# Patient Record
Sex: FEMALE | Race: OTHER | NOT HISPANIC OR LATINO | URBAN - METROPOLITAN AREA
[De-identification: names, ages, dates, MRNs, and addresses within clinical notes are randomized per-mention and may not be internally consistent; named-entity substitution may affect disease eponyms.]

---

## 2017-05-18 ENCOUNTER — OUTPATIENT (OUTPATIENT)
Dept: OUTPATIENT SERVICES | Facility: HOSPITAL | Age: 42
LOS: 1 days | Discharge: HOME | End: 2017-05-18

## 2017-06-15 ENCOUNTER — OUTPATIENT (OUTPATIENT)
Dept: OUTPATIENT SERVICES | Facility: HOSPITAL | Age: 42
LOS: 1 days | Discharge: HOME | End: 2017-06-15

## 2017-06-28 DIAGNOSIS — F25.0 SCHIZOAFFECTIVE DISORDER, BIPOLAR TYPE: ICD-10-CM

## 2017-07-13 ENCOUNTER — OUTPATIENT (OUTPATIENT)
Dept: OUTPATIENT SERVICES | Facility: HOSPITAL | Age: 42
LOS: 1 days | Discharge: HOME | End: 2017-07-13

## 2017-07-13 DIAGNOSIS — F25.0 SCHIZOAFFECTIVE DISORDER, BIPOLAR TYPE: ICD-10-CM

## 2017-08-31 ENCOUNTER — OUTPATIENT (OUTPATIENT)
Dept: OUTPATIENT SERVICES | Facility: HOSPITAL | Age: 42
LOS: 1 days | Discharge: HOME | End: 2017-08-31

## 2017-08-31 DIAGNOSIS — F25.0 SCHIZOAFFECTIVE DISORDER, BIPOLAR TYPE: ICD-10-CM

## 2017-09-26 ENCOUNTER — OUTPATIENT (OUTPATIENT)
Dept: OUTPATIENT SERVICES | Facility: HOSPITAL | Age: 42
LOS: 1 days | Discharge: HOME | End: 2017-09-26

## 2017-09-26 DIAGNOSIS — F25.0 SCHIZOAFFECTIVE DISORDER, BIPOLAR TYPE: ICD-10-CM

## 2017-10-18 ENCOUNTER — OUTPATIENT (OUTPATIENT)
Dept: OUTPATIENT SERVICES | Facility: HOSPITAL | Age: 42
LOS: 1 days | Discharge: HOME | End: 2017-10-18

## 2017-10-18 DIAGNOSIS — F25.0 SCHIZOAFFECTIVE DISORDER, BIPOLAR TYPE: ICD-10-CM

## 2017-11-09 ENCOUNTER — OUTPATIENT (OUTPATIENT)
Dept: OUTPATIENT SERVICES | Facility: HOSPITAL | Age: 42
LOS: 1 days | Discharge: HOME | End: 2017-11-09

## 2017-11-09 DIAGNOSIS — F25.0 SCHIZOAFFECTIVE DISORDER, BIPOLAR TYPE: ICD-10-CM

## 2017-12-21 ENCOUNTER — OUTPATIENT (OUTPATIENT)
Dept: OUTPATIENT SERVICES | Facility: HOSPITAL | Age: 42
LOS: 1 days | Discharge: HOME | End: 2017-12-21

## 2017-12-21 DIAGNOSIS — F25.0 SCHIZOAFFECTIVE DISORDER, BIPOLAR TYPE: ICD-10-CM

## 2018-01-23 ENCOUNTER — OUTPATIENT (OUTPATIENT)
Dept: OUTPATIENT SERVICES | Facility: HOSPITAL | Age: 43
LOS: 1 days | Discharge: HOME | End: 2018-01-23

## 2018-01-23 DIAGNOSIS — F25.0 SCHIZOAFFECTIVE DISORDER, BIPOLAR TYPE: ICD-10-CM

## 2018-02-27 ENCOUNTER — OUTPATIENT (OUTPATIENT)
Dept: OUTPATIENT SERVICES | Facility: HOSPITAL | Age: 43
LOS: 1 days | Discharge: HOME | End: 2018-02-27

## 2018-02-27 DIAGNOSIS — F25.0 SCHIZOAFFECTIVE DISORDER, BIPOLAR TYPE: ICD-10-CM

## 2018-04-24 ENCOUNTER — OUTPATIENT (OUTPATIENT)
Dept: OUTPATIENT SERVICES | Facility: HOSPITAL | Age: 43
LOS: 1 days | Discharge: HOME | End: 2018-04-24

## 2018-04-24 DIAGNOSIS — F25.0 SCHIZOAFFECTIVE DISORDER, BIPOLAR TYPE: ICD-10-CM

## 2018-05-14 ENCOUNTER — OUTPATIENT (OUTPATIENT)
Dept: OUTPATIENT SERVICES | Facility: HOSPITAL | Age: 43
LOS: 1 days | Discharge: HOME | End: 2018-05-14

## 2018-05-14 DIAGNOSIS — F25.0 SCHIZOAFFECTIVE DISORDER, BIPOLAR TYPE: ICD-10-CM

## 2018-06-11 ENCOUNTER — OUTPATIENT (OUTPATIENT)
Dept: OUTPATIENT SERVICES | Facility: HOSPITAL | Age: 43
LOS: 1 days | Discharge: HOME | End: 2018-06-11

## 2018-06-11 DIAGNOSIS — F25.0 SCHIZOAFFECTIVE DISORDER, BIPOLAR TYPE: ICD-10-CM

## 2018-07-16 ENCOUNTER — OUTPATIENT (OUTPATIENT)
Dept: OUTPATIENT SERVICES | Facility: HOSPITAL | Age: 43
LOS: 1 days | Discharge: HOME | End: 2018-07-16

## 2018-07-16 DIAGNOSIS — F25.0 SCHIZOAFFECTIVE DISORDER, BIPOLAR TYPE: ICD-10-CM

## 2018-10-16 ENCOUNTER — OUTPATIENT (OUTPATIENT)
Dept: OUTPATIENT SERVICES | Facility: HOSPITAL | Age: 43
LOS: 1 days | Discharge: HOME | End: 2018-10-16

## 2018-10-16 DIAGNOSIS — F25.0 SCHIZOAFFECTIVE DISORDER, BIPOLAR TYPE: ICD-10-CM

## 2018-11-27 ENCOUNTER — OUTPATIENT (OUTPATIENT)
Dept: OUTPATIENT SERVICES | Facility: HOSPITAL | Age: 43
LOS: 1 days | Discharge: HOME | End: 2018-11-27

## 2018-11-27 DIAGNOSIS — F25.0 SCHIZOAFFECTIVE DISORDER, BIPOLAR TYPE: ICD-10-CM

## 2018-12-19 ENCOUNTER — OUTPATIENT (OUTPATIENT)
Dept: OUTPATIENT SERVICES | Facility: HOSPITAL | Age: 43
LOS: 1 days | Discharge: HOME | End: 2018-12-19

## 2018-12-19 DIAGNOSIS — F25.0 SCHIZOAFFECTIVE DISORDER, BIPOLAR TYPE: ICD-10-CM

## 2019-01-03 ENCOUNTER — OUTPATIENT (OUTPATIENT)
Dept: OUTPATIENT SERVICES | Facility: HOSPITAL | Age: 44
LOS: 1 days | Discharge: HOME | End: 2019-01-03

## 2019-01-03 DIAGNOSIS — E13.9 OTHER SPECIFIED DIABETES MELLITUS WITHOUT COMPLICATIONS: ICD-10-CM

## 2019-01-03 DIAGNOSIS — E66.01 MORBID (SEVERE) OBESITY DUE TO EXCESS CALORIES: ICD-10-CM

## 2019-01-03 DIAGNOSIS — R94.6 ABNORMAL RESULTS OF THYROID FUNCTION STUDIES: ICD-10-CM

## 2019-01-03 DIAGNOSIS — E78.5 HYPERLIPIDEMIA, UNSPECIFIED: ICD-10-CM

## 2019-01-03 DIAGNOSIS — R00.2 PALPITATIONS: ICD-10-CM

## 2019-01-03 DIAGNOSIS — D51.9 VITAMIN B12 DEFICIENCY ANEMIA, UNSPECIFIED: ICD-10-CM

## 2019-01-03 DIAGNOSIS — R53.82 CHRONIC FATIGUE, UNSPECIFIED: ICD-10-CM

## 2019-01-24 ENCOUNTER — OUTPATIENT (OUTPATIENT)
Dept: OUTPATIENT SERVICES | Facility: HOSPITAL | Age: 44
LOS: 1 days | Discharge: HOME | End: 2019-01-24

## 2019-01-24 DIAGNOSIS — F25.0 SCHIZOAFFECTIVE DISORDER, BIPOLAR TYPE: ICD-10-CM

## 2019-03-11 ENCOUNTER — OUTPATIENT (OUTPATIENT)
Dept: OUTPATIENT SERVICES | Facility: HOSPITAL | Age: 44
LOS: 1 days | Discharge: HOME | End: 2019-03-11

## 2019-03-11 DIAGNOSIS — F25.0 SCHIZOAFFECTIVE DISORDER, BIPOLAR TYPE: ICD-10-CM

## 2019-03-28 ENCOUNTER — OUTPATIENT (OUTPATIENT)
Dept: OUTPATIENT SERVICES | Facility: HOSPITAL | Age: 44
LOS: 1 days | Discharge: HOME | End: 2019-03-28

## 2019-03-28 DIAGNOSIS — F25.0 SCHIZOAFFECTIVE DISORDER, BIPOLAR TYPE: ICD-10-CM

## 2019-04-18 ENCOUNTER — OUTPATIENT (OUTPATIENT)
Dept: OUTPATIENT SERVICES | Facility: HOSPITAL | Age: 44
LOS: 1 days | Discharge: HOME | End: 2019-04-18

## 2019-04-18 DIAGNOSIS — F25.0 SCHIZOAFFECTIVE DISORDER, BIPOLAR TYPE: ICD-10-CM

## 2019-05-09 ENCOUNTER — OUTPATIENT (OUTPATIENT)
Dept: OUTPATIENT SERVICES | Facility: HOSPITAL | Age: 44
LOS: 1 days | Discharge: HOME | End: 2019-05-09

## 2019-05-09 DIAGNOSIS — F25.0 SCHIZOAFFECTIVE DISORDER, BIPOLAR TYPE: ICD-10-CM

## 2019-05-16 ENCOUNTER — OUTPATIENT (OUTPATIENT)
Dept: OUTPATIENT SERVICES | Facility: HOSPITAL | Age: 44
LOS: 1 days | Discharge: HOME | End: 2019-05-16

## 2019-05-16 DIAGNOSIS — R53.82 CHRONIC FATIGUE, UNSPECIFIED: ICD-10-CM

## 2019-05-16 DIAGNOSIS — E03.9 HYPOTHYROIDISM, UNSPECIFIED: ICD-10-CM

## 2019-05-16 DIAGNOSIS — R94.6 ABNORMAL RESULTS OF THYROID FUNCTION STUDIES: ICD-10-CM

## 2019-05-30 ENCOUNTER — OUTPATIENT (OUTPATIENT)
Dept: OUTPATIENT SERVICES | Facility: HOSPITAL | Age: 44
LOS: 1 days | Discharge: HOME | End: 2019-05-30

## 2019-05-30 DIAGNOSIS — F25.0 SCHIZOAFFECTIVE DISORDER, BIPOLAR TYPE: ICD-10-CM

## 2019-06-13 ENCOUNTER — OUTPATIENT (OUTPATIENT)
Dept: OUTPATIENT SERVICES | Facility: HOSPITAL | Age: 44
LOS: 1 days | Discharge: HOME | End: 2019-06-13
Payer: MEDICARE

## 2019-06-13 DIAGNOSIS — F25.0 SCHIZOAFFECTIVE DISORDER, BIPOLAR TYPE: ICD-10-CM

## 2019-06-13 PROCEDURE — 99215 OFFICE O/P EST HI 40 MIN: CPT

## 2019-06-28 ENCOUNTER — OUTPATIENT (OUTPATIENT)
Dept: OUTPATIENT SERVICES | Facility: HOSPITAL | Age: 44
LOS: 1 days | Discharge: HOME | End: 2019-06-28

## 2019-06-28 DIAGNOSIS — F25.0 SCHIZOAFFECTIVE DISORDER, BIPOLAR TYPE: ICD-10-CM

## 2019-07-25 ENCOUNTER — OUTPATIENT (OUTPATIENT)
Dept: OUTPATIENT SERVICES | Facility: HOSPITAL | Age: 44
LOS: 1 days | Discharge: HOME | End: 2019-07-25

## 2019-07-25 DIAGNOSIS — F25.0 SCHIZOAFFECTIVE DISORDER, BIPOLAR TYPE: ICD-10-CM

## 2019-09-05 ENCOUNTER — OUTPATIENT (OUTPATIENT)
Dept: OUTPATIENT SERVICES | Facility: HOSPITAL | Age: 44
LOS: 1 days | Discharge: HOME | End: 2019-09-05

## 2019-09-05 DIAGNOSIS — F25.0 SCHIZOAFFECTIVE DISORDER, BIPOLAR TYPE: ICD-10-CM

## 2019-09-09 ENCOUNTER — OUTPATIENT (OUTPATIENT)
Dept: OUTPATIENT SERVICES | Facility: HOSPITAL | Age: 44
LOS: 1 days | Discharge: HOME | End: 2019-09-09
Payer: MEDICARE

## 2019-09-09 DIAGNOSIS — F25.0 SCHIZOAFFECTIVE DISORDER, BIPOLAR TYPE: ICD-10-CM

## 2019-09-09 PROCEDURE — 99214 OFFICE O/P EST MOD 30 MIN: CPT

## 2019-09-12 ENCOUNTER — OUTPATIENT (OUTPATIENT)
Dept: OUTPATIENT SERVICES | Facility: HOSPITAL | Age: 44
LOS: 1 days | Discharge: HOME | End: 2019-09-12

## 2019-09-12 DIAGNOSIS — R94.6 ABNORMAL RESULTS OF THYROID FUNCTION STUDIES: ICD-10-CM

## 2019-09-12 DIAGNOSIS — R53.82 CHRONIC FATIGUE, UNSPECIFIED: ICD-10-CM

## 2019-09-12 DIAGNOSIS — D51.9 VITAMIN B12 DEFICIENCY ANEMIA, UNSPECIFIED: ICD-10-CM

## 2019-09-12 DIAGNOSIS — E78.5 HYPERLIPIDEMIA, UNSPECIFIED: ICD-10-CM

## 2019-09-12 DIAGNOSIS — E13.9 OTHER SPECIFIED DIABETES MELLITUS WITHOUT COMPLICATIONS: ICD-10-CM

## 2019-09-12 DIAGNOSIS — E66.01 MORBID (SEVERE) OBESITY DUE TO EXCESS CALORIES: ICD-10-CM

## 2019-09-12 DIAGNOSIS — E03.9 HYPOTHYROIDISM, UNSPECIFIED: ICD-10-CM

## 2019-09-26 ENCOUNTER — OUTPATIENT (OUTPATIENT)
Dept: OUTPATIENT SERVICES | Facility: HOSPITAL | Age: 44
LOS: 1 days | Discharge: HOME | End: 2019-09-26

## 2019-09-26 DIAGNOSIS — F25.0 SCHIZOAFFECTIVE DISORDER, BIPOLAR TYPE: ICD-10-CM

## 2019-10-17 ENCOUNTER — OUTPATIENT (OUTPATIENT)
Dept: OUTPATIENT SERVICES | Facility: HOSPITAL | Age: 44
LOS: 1 days | Discharge: HOME | End: 2019-10-17

## 2019-10-17 DIAGNOSIS — F25.0 SCHIZOAFFECTIVE DISORDER, BIPOLAR TYPE: ICD-10-CM

## 2019-11-12 ENCOUNTER — OUTPATIENT (OUTPATIENT)
Dept: OUTPATIENT SERVICES | Facility: HOSPITAL | Age: 44
LOS: 1 days | Discharge: HOME | End: 2019-11-12

## 2019-11-12 DIAGNOSIS — F25.0 SCHIZOAFFECTIVE DISORDER, BIPOLAR TYPE: ICD-10-CM

## 2019-12-12 ENCOUNTER — OUTPATIENT (OUTPATIENT)
Dept: OUTPATIENT SERVICES | Facility: HOSPITAL | Age: 44
LOS: 1 days | Discharge: HOME | End: 2019-12-12

## 2019-12-13 DIAGNOSIS — R53.82 CHRONIC FATIGUE, UNSPECIFIED: ICD-10-CM

## 2019-12-13 DIAGNOSIS — R94.6 ABNORMAL RESULTS OF THYROID FUNCTION STUDIES: ICD-10-CM

## 2020-01-08 ENCOUNTER — OUTPATIENT (OUTPATIENT)
Dept: OUTPATIENT SERVICES | Facility: HOSPITAL | Age: 45
LOS: 1 days | Discharge: HOME | End: 2020-01-08
Payer: MEDICARE

## 2020-01-08 DIAGNOSIS — F25.0 SCHIZOAFFECTIVE DISORDER, BIPOLAR TYPE: ICD-10-CM

## 2020-01-08 PROCEDURE — 99213 OFFICE O/P EST LOW 20 MIN: CPT

## 2020-01-09 ENCOUNTER — OUTPATIENT (OUTPATIENT)
Dept: OUTPATIENT SERVICES | Facility: HOSPITAL | Age: 45
LOS: 1 days | Discharge: HOME | End: 2020-01-09

## 2020-01-09 DIAGNOSIS — F25.0 SCHIZOAFFECTIVE DISORDER, BIPOLAR TYPE: ICD-10-CM

## 2020-02-04 ENCOUNTER — OUTPATIENT (OUTPATIENT)
Dept: OUTPATIENT SERVICES | Facility: HOSPITAL | Age: 45
LOS: 1 days | Discharge: HOME | End: 2020-02-04

## 2020-02-04 DIAGNOSIS — F25.0 SCHIZOAFFECTIVE DISORDER, BIPOLAR TYPE: ICD-10-CM

## 2020-02-27 ENCOUNTER — OUTPATIENT (OUTPATIENT)
Dept: OUTPATIENT SERVICES | Facility: HOSPITAL | Age: 45
LOS: 1 days | Discharge: HOME | End: 2020-02-27

## 2020-02-27 DIAGNOSIS — F25.0 SCHIZOAFFECTIVE DISORDER, BIPOLAR TYPE: ICD-10-CM

## 2020-03-19 ENCOUNTER — OUTPATIENT (OUTPATIENT)
Dept: OUTPATIENT SERVICES | Facility: HOSPITAL | Age: 45
LOS: 1 days | Discharge: HOME | End: 2020-03-19

## 2020-03-24 DIAGNOSIS — F25.0 SCHIZOAFFECTIVE DISORDER, BIPOLAR TYPE: ICD-10-CM

## 2020-04-08 ENCOUNTER — OUTPATIENT (OUTPATIENT)
Dept: OUTPATIENT SERVICES | Facility: HOSPITAL | Age: 45
LOS: 1 days | Discharge: HOME | End: 2020-04-08
Payer: MEDICARE

## 2020-04-08 DIAGNOSIS — F25.0 SCHIZOAFFECTIVE DISORDER, BIPOLAR TYPE: ICD-10-CM

## 2020-04-08 PROCEDURE — 99442: CPT

## 2020-04-09 ENCOUNTER — OUTPATIENT (OUTPATIENT)
Dept: OUTPATIENT SERVICES | Facility: HOSPITAL | Age: 45
LOS: 1 days | Discharge: HOME | End: 2020-04-09

## 2020-04-30 ENCOUNTER — OUTPATIENT (OUTPATIENT)
Dept: OUTPATIENT SERVICES | Facility: HOSPITAL | Age: 45
LOS: 1 days | Discharge: HOME | End: 2020-04-30

## 2020-04-30 DIAGNOSIS — F25.0 SCHIZOAFFECTIVE DISORDER, BIPOLAR TYPE: ICD-10-CM

## 2020-05-21 ENCOUNTER — OUTPATIENT (OUTPATIENT)
Dept: OUTPATIENT SERVICES | Facility: HOSPITAL | Age: 45
LOS: 1 days | Discharge: HOME | End: 2020-05-21

## 2020-05-21 DIAGNOSIS — F25.0 SCHIZOAFFECTIVE DISORDER, BIPOLAR TYPE: ICD-10-CM

## 2020-06-11 ENCOUNTER — OUTPATIENT (OUTPATIENT)
Dept: OUTPATIENT SERVICES | Facility: HOSPITAL | Age: 45
LOS: 1 days | Discharge: HOME | End: 2020-06-11

## 2020-06-11 DIAGNOSIS — F25.0 SCHIZOAFFECTIVE DISORDER, BIPOLAR TYPE: ICD-10-CM

## 2020-07-23 ENCOUNTER — OUTPATIENT (OUTPATIENT)
Dept: OUTPATIENT SERVICES | Facility: HOSPITAL | Age: 45
LOS: 1 days | Discharge: HOME | End: 2020-07-23

## 2020-07-23 DIAGNOSIS — F25.0 SCHIZOAFFECTIVE DISORDER, BIPOLAR TYPE: ICD-10-CM

## 2020-08-25 ENCOUNTER — OUTPATIENT (OUTPATIENT)
Dept: OUTPATIENT SERVICES | Facility: HOSPITAL | Age: 45
LOS: 1 days | Discharge: HOME | End: 2020-08-25
Payer: MEDICARE

## 2020-08-25 DIAGNOSIS — F25.0 SCHIZOAFFECTIVE DISORDER, BIPOLAR TYPE: ICD-10-CM

## 2020-08-25 PROCEDURE — 99214 OFFICE O/P EST MOD 30 MIN: CPT | Mod: 95

## 2020-08-27 ENCOUNTER — OUTPATIENT (OUTPATIENT)
Dept: OUTPATIENT SERVICES | Facility: HOSPITAL | Age: 45
LOS: 1 days | Discharge: HOME | End: 2020-08-27

## 2020-08-27 DIAGNOSIS — F25.0 SCHIZOAFFECTIVE DISORDER, BIPOLAR TYPE: ICD-10-CM

## 2020-09-23 ENCOUNTER — OUTPATIENT (OUTPATIENT)
Dept: OUTPATIENT SERVICES | Facility: HOSPITAL | Age: 45
LOS: 1 days | Discharge: HOME | End: 2020-09-23

## 2020-09-23 DIAGNOSIS — F25.0 SCHIZOAFFECTIVE DISORDER, BIPOLAR TYPE: ICD-10-CM

## 2020-10-21 ENCOUNTER — OUTPATIENT (OUTPATIENT)
Dept: OUTPATIENT SERVICES | Facility: HOSPITAL | Age: 45
LOS: 1 days | Discharge: HOME | End: 2020-10-21

## 2020-10-21 DIAGNOSIS — F25.0 SCHIZOAFFECTIVE DISORDER, BIPOLAR TYPE: ICD-10-CM

## 2020-11-17 ENCOUNTER — OUTPATIENT (OUTPATIENT)
Dept: OUTPATIENT SERVICES | Facility: HOSPITAL | Age: 45
LOS: 1 days | Discharge: HOME | End: 2020-11-17
Payer: MEDICARE

## 2020-11-17 ENCOUNTER — APPOINTMENT (OUTPATIENT)
Dept: PSYCHIATRY | Facility: CLINIC | Age: 45
End: 2020-11-17

## 2020-11-17 DIAGNOSIS — F25.0 SCHIZOAFFECTIVE DISORDER, BIPOLAR TYPE: ICD-10-CM

## 2020-11-17 DIAGNOSIS — F43.10 POST-TRAUMATIC STRESS DISORDER, UNSPECIFIED: ICD-10-CM

## 2020-11-17 PROCEDURE — 99213 OFFICE O/P EST LOW 20 MIN: CPT | Mod: 95

## 2020-11-18 ENCOUNTER — APPOINTMENT (OUTPATIENT)
Dept: PSYCHIATRY | Facility: CLINIC | Age: 45
End: 2020-11-18

## 2020-11-18 ENCOUNTER — OUTPATIENT (OUTPATIENT)
Dept: OUTPATIENT SERVICES | Facility: HOSPITAL | Age: 45
LOS: 1 days | Discharge: HOME | End: 2020-11-18

## 2020-11-20 DIAGNOSIS — F25.0 SCHIZOAFFECTIVE DISORDER, BIPOLAR TYPE: ICD-10-CM

## 2020-11-20 DIAGNOSIS — F43.10 POST-TRAUMATIC STRESS DISORDER, UNSPECIFIED: ICD-10-CM

## 2020-12-15 ENCOUNTER — OUTPATIENT (OUTPATIENT)
Dept: OUTPATIENT SERVICES | Facility: HOSPITAL | Age: 45
LOS: 1 days | Discharge: HOME | End: 2020-12-15

## 2020-12-15 ENCOUNTER — APPOINTMENT (OUTPATIENT)
Dept: PSYCHIATRY | Facility: CLINIC | Age: 45
End: 2020-12-15

## 2020-12-15 DIAGNOSIS — F43.10 POST-TRAUMATIC STRESS DISORDER, UNSPECIFIED: ICD-10-CM

## 2020-12-15 DIAGNOSIS — F25.0 SCHIZOAFFECTIVE DISORDER, BIPOLAR TYPE: ICD-10-CM

## 2020-12-15 PROBLEM — Z00.00 ENCOUNTER FOR PREVENTIVE HEALTH EXAMINATION: Status: ACTIVE | Noted: 2020-12-15

## 2020-12-16 DIAGNOSIS — Z92.89 PERSONAL HISTORY OF OTHER MEDICAL TREATMENT: ICD-10-CM

## 2020-12-16 DIAGNOSIS — Z78.9 OTHER SPECIFIED HEALTH STATUS: ICD-10-CM

## 2020-12-16 RX ORDER — FAMOTIDINE 20 MG/1
20 TABLET, FILM COATED ORAL
Refills: 0 | Status: ACTIVE | COMMUNITY

## 2020-12-16 RX ORDER — FLUTICASONE PROPIONATE 50 MCG
SPRAY, SUSPENSION NASAL
Refills: 0 | Status: ACTIVE | COMMUNITY

## 2020-12-16 RX ORDER — LEVOTHYROXINE SODIUM 50 UG/1
50 TABLET ORAL DAILY
Refills: 0 | Status: ACTIVE | COMMUNITY

## 2021-01-20 ENCOUNTER — NON-APPOINTMENT (OUTPATIENT)
Age: 46
End: 2021-01-20

## 2021-01-20 ENCOUNTER — APPOINTMENT (OUTPATIENT)
Dept: PSYCHIATRY | Facility: CLINIC | Age: 46
End: 2021-01-20

## 2021-01-20 ENCOUNTER — OUTPATIENT (OUTPATIENT)
Dept: OUTPATIENT SERVICES | Facility: HOSPITAL | Age: 46
LOS: 1 days | Discharge: HOME | End: 2021-01-20

## 2021-01-20 DIAGNOSIS — F43.10 POST-TRAUMATIC STRESS DISORDER, UNSPECIFIED: ICD-10-CM

## 2021-01-20 DIAGNOSIS — F25.0 SCHIZOAFFECTIVE DISORDER, BIPOLAR TYPE: ICD-10-CM

## 2021-02-08 ENCOUNTER — OUTPATIENT (OUTPATIENT)
Dept: OUTPATIENT SERVICES | Facility: HOSPITAL | Age: 46
LOS: 1 days | Discharge: HOME | End: 2021-02-08

## 2021-02-08 ENCOUNTER — APPOINTMENT (OUTPATIENT)
Dept: PSYCHIATRY | Facility: CLINIC | Age: 46
End: 2021-02-08
Payer: MEDICARE

## 2021-02-08 DIAGNOSIS — F25.0 SCHIZOAFFECTIVE DISORDER, BIPOLAR TYPE: ICD-10-CM

## 2021-02-08 DIAGNOSIS — F43.10 POST-TRAUMATIC STRESS DISORDER, UNSPECIFIED: ICD-10-CM

## 2021-02-08 DIAGNOSIS — E03.9 HYPOTHYROIDISM, UNSPECIFIED: ICD-10-CM

## 2021-02-08 DIAGNOSIS — K21.9 GASTRO-ESOPHAGEAL REFLUX DISEASE W/OUT ESOPHAGITIS: ICD-10-CM

## 2021-02-08 PROCEDURE — 99442: CPT

## 2021-02-24 ENCOUNTER — APPOINTMENT (OUTPATIENT)
Dept: PSYCHIATRY | Facility: CLINIC | Age: 46
End: 2021-02-24

## 2021-02-24 ENCOUNTER — OUTPATIENT (OUTPATIENT)
Dept: OUTPATIENT SERVICES | Facility: HOSPITAL | Age: 46
LOS: 1 days | Discharge: HOME | End: 2021-02-24

## 2021-02-24 DIAGNOSIS — F25.0 SCHIZOAFFECTIVE DISORDER, BIPOLAR TYPE: ICD-10-CM

## 2021-02-24 DIAGNOSIS — F43.10 POST-TRAUMATIC STRESS DISORDER, UNSPECIFIED: ICD-10-CM

## 2021-03-24 ENCOUNTER — APPOINTMENT (OUTPATIENT)
Dept: PSYCHIATRY | Facility: CLINIC | Age: 46
End: 2021-03-24

## 2021-03-24 ENCOUNTER — OUTPATIENT (OUTPATIENT)
Dept: OUTPATIENT SERVICES | Facility: HOSPITAL | Age: 46
LOS: 1 days | Discharge: HOME | End: 2021-03-24

## 2021-03-24 DIAGNOSIS — F25.0 SCHIZOAFFECTIVE DISORDER, BIPOLAR TYPE: ICD-10-CM

## 2021-03-24 DIAGNOSIS — F43.10 POST-TRAUMATIC STRESS DISORDER, UNSPECIFIED: ICD-10-CM

## 2021-03-29 ENCOUNTER — APPOINTMENT (OUTPATIENT)
Dept: PSYCHIATRY | Facility: CLINIC | Age: 46
End: 2021-03-29

## 2021-04-02 ENCOUNTER — APPOINTMENT (OUTPATIENT)
Dept: PSYCHIATRY | Facility: CLINIC | Age: 46
End: 2021-04-02

## 2021-04-21 ENCOUNTER — NON-APPOINTMENT (OUTPATIENT)
Age: 46
End: 2021-04-21

## 2021-04-21 ENCOUNTER — OUTPATIENT (OUTPATIENT)
Dept: OUTPATIENT SERVICES | Facility: HOSPITAL | Age: 46
LOS: 1 days | Discharge: HOME | End: 2021-04-21

## 2021-04-21 ENCOUNTER — APPOINTMENT (OUTPATIENT)
Dept: PSYCHIATRY | Facility: CLINIC | Age: 46
End: 2021-04-21

## 2021-04-21 DIAGNOSIS — F43.10 POST-TRAUMATIC STRESS DISORDER, UNSPECIFIED: ICD-10-CM

## 2021-04-21 DIAGNOSIS — F25.0 SCHIZOAFFECTIVE DISORDER, BIPOLAR TYPE: ICD-10-CM

## 2021-05-05 ENCOUNTER — APPOINTMENT (OUTPATIENT)
Dept: PSYCHIATRY | Facility: CLINIC | Age: 46
End: 2021-05-05
Payer: MEDICARE

## 2021-05-05 ENCOUNTER — OUTPATIENT (OUTPATIENT)
Dept: OUTPATIENT SERVICES | Facility: HOSPITAL | Age: 46
LOS: 1 days | Discharge: HOME | End: 2021-05-05

## 2021-05-05 DIAGNOSIS — F25.0 SCHIZOAFFECTIVE DISORDER, BIPOLAR TYPE: ICD-10-CM

## 2021-05-05 DIAGNOSIS — F43.10 POST-TRAUMATIC STRESS DISORDER, UNSPECIFIED: ICD-10-CM

## 2021-05-05 PROCEDURE — 99213 OFFICE O/P EST LOW 20 MIN: CPT | Mod: 95

## 2021-05-05 RX ORDER — BENZTROPINE MESYLATE 1 MG
1 TABLET ORAL TWICE DAILY
Refills: 0 | Status: DISCONTINUED | COMMUNITY
End: 2021-05-05

## 2021-05-05 RX ORDER — PERPHENAZINE 4 MG
TABLET ORAL
Refills: 0 | Status: DISCONTINUED | COMMUNITY
End: 2021-05-05

## 2021-05-19 ENCOUNTER — OUTPATIENT (OUTPATIENT)
Dept: OUTPATIENT SERVICES | Facility: HOSPITAL | Age: 46
LOS: 1 days | Discharge: HOME | End: 2021-05-19

## 2021-05-19 ENCOUNTER — APPOINTMENT (OUTPATIENT)
Dept: PSYCHIATRY | Facility: CLINIC | Age: 46
End: 2021-05-19

## 2021-05-19 DIAGNOSIS — F43.10 POST-TRAUMATIC STRESS DISORDER, UNSPECIFIED: ICD-10-CM

## 2021-05-19 DIAGNOSIS — F25.0 SCHIZOAFFECTIVE DISORDER, BIPOLAR TYPE: ICD-10-CM

## 2021-06-30 ENCOUNTER — APPOINTMENT (OUTPATIENT)
Dept: PSYCHIATRY | Facility: CLINIC | Age: 46
End: 2021-06-30

## 2021-06-30 ENCOUNTER — NON-APPOINTMENT (OUTPATIENT)
Age: 46
End: 2021-06-30

## 2021-06-30 ENCOUNTER — OUTPATIENT (OUTPATIENT)
Dept: OUTPATIENT SERVICES | Facility: HOSPITAL | Age: 46
LOS: 1 days | Discharge: HOME | End: 2021-06-30

## 2021-06-30 DIAGNOSIS — F25.0 SCHIZOAFFECTIVE DISORDER, BIPOLAR TYPE: ICD-10-CM

## 2021-06-30 DIAGNOSIS — F43.10 POST-TRAUMATIC STRESS DISORDER, UNSPECIFIED: ICD-10-CM

## 2021-07-28 ENCOUNTER — APPOINTMENT (OUTPATIENT)
Dept: PSYCHIATRY | Facility: CLINIC | Age: 46
End: 2021-07-28
Payer: MEDICARE

## 2021-07-28 ENCOUNTER — OUTPATIENT (OUTPATIENT)
Dept: OUTPATIENT SERVICES | Facility: HOSPITAL | Age: 46
LOS: 1 days | Discharge: HOME | End: 2021-07-28

## 2021-07-28 DIAGNOSIS — F43.10 POST-TRAUMATIC STRESS DISORDER, UNSPECIFIED: ICD-10-CM

## 2021-07-28 DIAGNOSIS — F25.0 SCHIZOAFFECTIVE DISORDER, BIPOLAR TYPE: ICD-10-CM

## 2021-07-28 PROCEDURE — 99213 OFFICE O/P EST LOW 20 MIN: CPT | Mod: 95

## 2021-07-29 ENCOUNTER — NON-APPOINTMENT (OUTPATIENT)
Age: 46
End: 2021-07-29

## 2021-07-29 ENCOUNTER — APPOINTMENT (OUTPATIENT)
Dept: PSYCHIATRY | Facility: CLINIC | Age: 46
End: 2021-07-29

## 2021-07-29 ENCOUNTER — OUTPATIENT (OUTPATIENT)
Dept: OUTPATIENT SERVICES | Facility: HOSPITAL | Age: 46
LOS: 1 days | Discharge: HOME | End: 2021-07-29

## 2021-07-29 DIAGNOSIS — F43.10 POST-TRAUMATIC STRESS DISORDER, UNSPECIFIED: ICD-10-CM

## 2021-07-29 DIAGNOSIS — F25.0 SCHIZOAFFECTIVE DISORDER, BIPOLAR TYPE: ICD-10-CM

## 2021-08-26 ENCOUNTER — APPOINTMENT (OUTPATIENT)
Dept: PSYCHIATRY | Facility: CLINIC | Age: 46
End: 2021-08-26

## 2021-08-30 ENCOUNTER — OUTPATIENT (OUTPATIENT)
Dept: OUTPATIENT SERVICES | Facility: HOSPITAL | Age: 46
LOS: 1 days | Discharge: HOME | End: 2021-08-30

## 2021-08-30 ENCOUNTER — APPOINTMENT (OUTPATIENT)
Dept: PSYCHIATRY | Facility: CLINIC | Age: 46
End: 2021-08-30

## 2021-08-30 DIAGNOSIS — F25.0 SCHIZOAFFECTIVE DISORDER, BIPOLAR TYPE: ICD-10-CM

## 2021-08-30 DIAGNOSIS — F43.10 POST-TRAUMATIC STRESS DISORDER, UNSPECIFIED: ICD-10-CM

## 2021-09-29 ENCOUNTER — APPOINTMENT (OUTPATIENT)
Dept: PSYCHIATRY | Facility: CLINIC | Age: 46
End: 2021-09-29

## 2021-10-19 ENCOUNTER — NON-APPOINTMENT (OUTPATIENT)
Age: 46
End: 2021-10-19

## 2021-10-19 ENCOUNTER — APPOINTMENT (OUTPATIENT)
Dept: PSYCHIATRY | Facility: CLINIC | Age: 46
End: 2021-10-19

## 2021-10-19 ENCOUNTER — OUTPATIENT (OUTPATIENT)
Dept: OUTPATIENT SERVICES | Facility: HOSPITAL | Age: 46
LOS: 1 days | Discharge: HOME | End: 2021-10-19

## 2021-10-19 DIAGNOSIS — F43.10 POST-TRAUMATIC STRESS DISORDER, UNSPECIFIED: ICD-10-CM

## 2021-10-19 DIAGNOSIS — F25.0 SCHIZOAFFECTIVE DISORDER, BIPOLAR TYPE: ICD-10-CM

## 2021-11-17 ENCOUNTER — OUTPATIENT (OUTPATIENT)
Dept: OUTPATIENT SERVICES | Facility: HOSPITAL | Age: 46
LOS: 1 days | Discharge: HOME | End: 2021-11-17

## 2021-11-17 ENCOUNTER — APPOINTMENT (OUTPATIENT)
Dept: PSYCHIATRY | Facility: CLINIC | Age: 46
End: 2021-11-17

## 2021-11-17 DIAGNOSIS — F43.10 POST-TRAUMATIC STRESS DISORDER, UNSPECIFIED: ICD-10-CM

## 2021-11-17 DIAGNOSIS — F25.0 SCHIZOAFFECTIVE DISORDER, BIPOLAR TYPE: ICD-10-CM

## 2021-11-26 ENCOUNTER — OUTPATIENT (OUTPATIENT)
Dept: OUTPATIENT SERVICES | Facility: HOSPITAL | Age: 46
LOS: 1 days | Discharge: HOME | End: 2021-11-26

## 2021-11-26 ENCOUNTER — APPOINTMENT (OUTPATIENT)
Dept: PSYCHIATRY | Facility: CLINIC | Age: 46
End: 2021-11-26
Payer: MEDICARE

## 2021-11-26 DIAGNOSIS — F43.10 POST-TRAUMATIC STRESS DISORDER, UNSPECIFIED: ICD-10-CM

## 2021-11-26 DIAGNOSIS — F25.0 SCHIZOAFFECTIVE DISORDER, BIPOLAR TYPE: ICD-10-CM

## 2021-11-26 PROCEDURE — 99214 OFFICE O/P EST MOD 30 MIN: CPT | Mod: 95

## 2021-12-15 ENCOUNTER — OUTPATIENT (OUTPATIENT)
Dept: OUTPATIENT SERVICES | Facility: HOSPITAL | Age: 46
LOS: 1 days | Discharge: HOME | End: 2021-12-15

## 2021-12-15 ENCOUNTER — APPOINTMENT (OUTPATIENT)
Dept: PSYCHIATRY | Facility: CLINIC | Age: 46
End: 2021-12-15

## 2021-12-15 DIAGNOSIS — F43.10 POST-TRAUMATIC STRESS DISORDER, UNSPECIFIED: ICD-10-CM

## 2021-12-15 DIAGNOSIS — F25.0 SCHIZOAFFECTIVE DISORDER, BIPOLAR TYPE: ICD-10-CM

## 2022-01-07 ENCOUNTER — APPOINTMENT (OUTPATIENT)
Dept: PSYCHIATRY | Facility: CLINIC | Age: 47
End: 2022-01-07

## 2022-01-12 ENCOUNTER — OUTPATIENT (OUTPATIENT)
Dept: OUTPATIENT SERVICES | Facility: HOSPITAL | Age: 47
LOS: 1 days | Discharge: HOME | End: 2022-01-12

## 2022-01-12 ENCOUNTER — APPOINTMENT (OUTPATIENT)
Dept: PSYCHIATRY | Facility: CLINIC | Age: 47
End: 2022-01-12

## 2022-01-12 DIAGNOSIS — F43.10 POST-TRAUMATIC STRESS DISORDER, UNSPECIFIED: ICD-10-CM

## 2022-01-12 DIAGNOSIS — F25.0 SCHIZOAFFECTIVE DISORDER, BIPOLAR TYPE: ICD-10-CM

## 2022-01-19 ENCOUNTER — NON-APPOINTMENT (OUTPATIENT)
Age: 47
End: 2022-01-19

## 2022-02-09 ENCOUNTER — APPOINTMENT (OUTPATIENT)
Dept: PSYCHIATRY | Facility: CLINIC | Age: 47
End: 2022-02-09

## 2022-02-09 ENCOUNTER — OUTPATIENT (OUTPATIENT)
Dept: OUTPATIENT SERVICES | Facility: HOSPITAL | Age: 47
LOS: 1 days | Discharge: HOME | End: 2022-02-09

## 2022-02-09 DIAGNOSIS — F25.0 SCHIZOAFFECTIVE DISORDER, BIPOLAR TYPE: ICD-10-CM

## 2022-02-09 DIAGNOSIS — F43.10 POST-TRAUMATIC STRESS DISORDER, UNSPECIFIED: ICD-10-CM

## 2022-03-03 ENCOUNTER — OUTPATIENT (OUTPATIENT)
Dept: OUTPATIENT SERVICES | Facility: HOSPITAL | Age: 47
LOS: 1 days | Discharge: HOME | End: 2022-03-03

## 2022-03-03 ENCOUNTER — APPOINTMENT (OUTPATIENT)
Dept: PSYCHIATRY | Facility: CLINIC | Age: 47
End: 2022-03-03
Payer: MEDICARE

## 2022-03-03 DIAGNOSIS — F43.10 POST-TRAUMATIC STRESS DISORDER, UNSPECIFIED: ICD-10-CM

## 2022-03-03 DIAGNOSIS — F25.0 SCHIZOAFFECTIVE DISORDER, BIPOLAR TYPE: ICD-10-CM

## 2022-03-03 PROCEDURE — 99214 OFFICE O/P EST MOD 30 MIN: CPT | Mod: 95

## 2022-03-03 RX ORDER — BUPROPION HYDROCHLORIDE 150 MG/1
150 TABLET, EXTENDED RELEASE ORAL DAILY
Qty: 30 | Refills: 2 | Status: DISCONTINUED | COMMUNITY
End: 2022-03-03

## 2022-03-09 ENCOUNTER — OUTPATIENT (OUTPATIENT)
Dept: OUTPATIENT SERVICES | Facility: HOSPITAL | Age: 47
LOS: 1 days | Discharge: HOME | End: 2022-03-09

## 2022-03-09 ENCOUNTER — APPOINTMENT (OUTPATIENT)
Dept: PSYCHIATRY | Facility: CLINIC | Age: 47
End: 2022-03-09

## 2022-03-09 DIAGNOSIS — F25.0 SCHIZOAFFECTIVE DISORDER, BIPOLAR TYPE: ICD-10-CM

## 2022-03-09 DIAGNOSIS — F43.10 POST-TRAUMATIC STRESS DISORDER, UNSPECIFIED: ICD-10-CM

## 2022-04-06 ENCOUNTER — APPOINTMENT (OUTPATIENT)
Dept: PSYCHIATRY | Facility: CLINIC | Age: 47
End: 2022-04-06

## 2022-04-06 ENCOUNTER — OUTPATIENT (OUTPATIENT)
Dept: OUTPATIENT SERVICES | Facility: HOSPITAL | Age: 47
LOS: 1 days | Discharge: HOME | End: 2022-04-06

## 2022-04-06 DIAGNOSIS — F25.0 SCHIZOAFFECTIVE DISORDER, BIPOLAR TYPE: ICD-10-CM

## 2022-04-06 DIAGNOSIS — F43.10 POST-TRAUMATIC STRESS DISORDER, UNSPECIFIED: ICD-10-CM

## 2022-05-11 ENCOUNTER — OUTPATIENT (OUTPATIENT)
Dept: OUTPATIENT SERVICES | Facility: HOSPITAL | Age: 47
LOS: 1 days | Discharge: HOME | End: 2022-05-11

## 2022-05-11 ENCOUNTER — APPOINTMENT (OUTPATIENT)
Dept: PSYCHIATRY | Facility: CLINIC | Age: 47
End: 2022-05-11

## 2022-05-11 DIAGNOSIS — F43.10 POST-TRAUMATIC STRESS DISORDER, UNSPECIFIED: ICD-10-CM

## 2022-05-11 DIAGNOSIS — F25.0 SCHIZOAFFECTIVE DISORDER, BIPOLAR TYPE: ICD-10-CM

## 2022-05-31 ENCOUNTER — OUTPATIENT (OUTPATIENT)
Dept: OUTPATIENT SERVICES | Facility: HOSPITAL | Age: 47
LOS: 1 days | Discharge: HOME | End: 2022-05-31

## 2022-05-31 ENCOUNTER — APPOINTMENT (OUTPATIENT)
Dept: PSYCHIATRY | Facility: CLINIC | Age: 47
End: 2022-05-31

## 2022-05-31 DIAGNOSIS — F25.0 SCHIZOAFFECTIVE DISORDER, BIPOLAR TYPE: ICD-10-CM

## 2022-05-31 DIAGNOSIS — F43.10 POST-TRAUMATIC STRESS DISORDER, UNSPECIFIED: ICD-10-CM

## 2022-05-31 PROCEDURE — 99214 OFFICE O/P EST MOD 30 MIN: CPT | Mod: 95

## 2022-06-15 ENCOUNTER — APPOINTMENT (OUTPATIENT)
Dept: PSYCHIATRY | Facility: CLINIC | Age: 47
End: 2022-06-15

## 2022-06-15 ENCOUNTER — OUTPATIENT (OUTPATIENT)
Dept: OUTPATIENT SERVICES | Facility: HOSPITAL | Age: 47
LOS: 1 days | Discharge: HOME | End: 2022-06-15

## 2022-06-15 DIAGNOSIS — F25.0 SCHIZOAFFECTIVE DISORDER, BIPOLAR TYPE: ICD-10-CM

## 2022-06-15 DIAGNOSIS — F43.10 POST-TRAUMATIC STRESS DISORDER, UNSPECIFIED: ICD-10-CM

## 2022-07-20 ENCOUNTER — OUTPATIENT (OUTPATIENT)
Dept: OUTPATIENT SERVICES | Facility: HOSPITAL | Age: 47
LOS: 1 days | Discharge: HOME | End: 2022-07-20

## 2022-07-20 ENCOUNTER — APPOINTMENT (OUTPATIENT)
Dept: PSYCHIATRY | Facility: CLINIC | Age: 47
End: 2022-07-20

## 2022-07-20 ENCOUNTER — NON-APPOINTMENT (OUTPATIENT)
Age: 47
End: 2022-07-20

## 2022-07-20 DIAGNOSIS — F25.0 SCHIZOAFFECTIVE DISORDER, BIPOLAR TYPE: ICD-10-CM

## 2022-07-20 DIAGNOSIS — F43.10 POST-TRAUMATIC STRESS DISORDER, UNSPECIFIED: ICD-10-CM

## 2022-08-17 ENCOUNTER — OUTPATIENT (OUTPATIENT)
Dept: OUTPATIENT SERVICES | Facility: HOSPITAL | Age: 47
LOS: 1 days | Discharge: HOME | End: 2022-08-17

## 2022-08-17 ENCOUNTER — APPOINTMENT (OUTPATIENT)
Dept: PSYCHIATRY | Facility: CLINIC | Age: 47
End: 2022-08-17

## 2022-08-17 DIAGNOSIS — F25.0 SCHIZOAFFECTIVE DISORDER, BIPOLAR TYPE: ICD-10-CM

## 2022-08-17 DIAGNOSIS — F43.10 POST-TRAUMATIC STRESS DISORDER, UNSPECIFIED: ICD-10-CM

## 2022-08-17 PROCEDURE — 99214 OFFICE O/P EST MOD 30 MIN: CPT | Mod: 95

## 2022-09-28 ENCOUNTER — NON-APPOINTMENT (OUTPATIENT)
Age: 47
End: 2022-09-28

## 2022-09-28 ENCOUNTER — OUTPATIENT (OUTPATIENT)
Dept: OUTPATIENT SERVICES | Facility: HOSPITAL | Age: 47
LOS: 1 days | Discharge: HOME | End: 2022-09-28

## 2022-09-28 ENCOUNTER — APPOINTMENT (OUTPATIENT)
Dept: PSYCHIATRY | Facility: CLINIC | Age: 47
End: 2022-09-28

## 2022-09-28 DIAGNOSIS — F25.0 SCHIZOAFFECTIVE DISORDER, BIPOLAR TYPE: ICD-10-CM

## 2022-09-28 DIAGNOSIS — F43.10 POST-TRAUMATIC STRESS DISORDER, UNSPECIFIED: ICD-10-CM

## 2022-10-26 ENCOUNTER — APPOINTMENT (OUTPATIENT)
Dept: PSYCHIATRY | Facility: CLINIC | Age: 47
End: 2022-10-26

## 2022-10-26 ENCOUNTER — OUTPATIENT (OUTPATIENT)
Dept: OUTPATIENT SERVICES | Facility: HOSPITAL | Age: 47
LOS: 1 days | Discharge: HOME | End: 2022-10-26

## 2022-10-26 DIAGNOSIS — F25.0 SCHIZOAFFECTIVE DISORDER, BIPOLAR TYPE: ICD-10-CM

## 2022-10-26 DIAGNOSIS — F43.10 POST-TRAUMATIC STRESS DISORDER, UNSPECIFIED: ICD-10-CM

## 2022-10-26 NOTE — PHYSICAL EXAM
[Cooperative] : cooperative [Euthymic] : euthymic [Labile] : labile [Clear] : clear [Linear/Goal Directed] : linear/goal directed [Grandiose] : grandiose [Average] : average [WNL] : within normal limits

## 2022-10-26 NOTE — REASON FOR VISIT
[Home] : at home, [unfilled] , at the time of the visit. [Medical Office: ([facility])___] : at the medical office located in  [Patient] : Patient [FreeTextEntry1] : Psychotherapy session

## 2022-10-26 NOTE — END OF VISIT
[Individual Psychotherapy for 16-37 minutes] : Individual Psychotherapy for 16-37 minutes [Teletherapy Service Provided] : The services provided in this session were delivered via tele-therapy

## 2022-10-26 NOTE — PLAN
[Supportive Therapy] : Supportive Therapy [Recommended Frequency of Visits: ____] : Recommended frequency of visits: [unfilled] [Return in ____ week(s)] : Return in [unfilled] week(s) [FreeTextEntry2] : Goal #1 " I want to take care of myself,  utilize my time constructively, and be independent" \par \par Objective #1 .Patient will participate in the "Ticket Back to Work" Program. \par Objective #2  Patient attend online writing workshop.  \par   [de-identified] : Patient's session time was changed to an earlier time today since she has to get X-rays of her heel. She reports hurting her heel while waking the wrong way at home and her doctor is sending her for X-rays. She.reports having a lot of emotions related to interpersonal and family issues. She reports some emotions are sad and painful and some are happy. She feels good about going a on job interview with [employer]. She reports that the [employer] Back to Work program helped her get this interview. Reinforced progress she has made with assertive communication, setting limits or boundaries, and saying "No". SHe also feels good about meeting and talking to a asuncion on [employer] dating site. She expressed frustration with her current living situation. She wants to move out on her own, preferably move to [place] and be a writer. Refocused patient to identify or focus on realistic plan of action for the present such as following up with job interviews, saving money, and engaging in other activity that promotes independence.  [FreeTextEntry1] : Patient will focus on positive coping, ways to increase independence, and follow up with job applications and interviews through [employer] Back to Work Program. She will continue medication regimen, follow up with medical needs and doctor, and followup  with therapy on 11/23.

## 2022-10-31 NOTE — DISCUSSION/SUMMARY
[Plan Review] : Plan Review [Adherent to treatment recommendations] : adherent to treatment recommendations [Articulate] : articulate [Cognitively intact] : cognitively intact [Motivated to participate in treatment] : motivated to participate in treatment [Motivated to maintain or improve physical health] : motivated to maintain or improve physical health [Part of a supportive family] : part of a supportive family [Involved in cultural/spiritual/Episcopalian/community activities] : involved in cultural/spiritual/Episcopalian/community activities [Housing stability] : housing stability [English fluency] : English fluency [Connected to healthcare] : connected to healthcare [Mental Health] : Mental Health [Continued - Progress made] : Continued - Progress made: [Revised - Rationale] : Revised - Rationale: [every ___ months] : every [unfilled] months [Attainment of higher level of functioning as evidenced by:] : Attainment of higher level of functioning as evidenced by: [None - Reason others did not participate:] : None - Reason others did not participate:  [Yes] : Yes [Psychiatric Provider/Prescriber] : Psychiatric Provider/Prescriber [Therapist] : Therapist [FreeTextEntry2] : 9/28/23 [FreeTextEntry3] : 12/13/22 [FreeTextEntry8] : None  [FreeTextEntry9] : None  [de-identified] : None  [FreeTextEntry1] : Patient has a long psychiatric history which included multiple hospital admissions. [FreeTextEntry4] : " I want to take care of myself and utilize my time constructively"   [de-identified] : Patient reports efforts to improve her mental and physical health. She reports increased motivation since she finally cleaned her room  [de-identified] : Patient will participate in " [employer] Back to Work Program"  [de-identified] : 9/28/23 [de-identified] : Patient has not been able to be consistent with diet and exercise. She now wants to focus on work activity.  [de-identified] : Patient will attend online writing workshop  [de-identified] : 9/28/23 [de-identified] : Patient wants to pursue  interest in writing  [FreeTextEntry5] :  Supportive therapy to hep patient maintain stability, learn positive coping methods, and utilize her time constructively., \par \par  [de-identified] : Patient can be treated by a provider in the community for medication management  [de-identified] : Not clinically indicated

## 2022-10-31 NOTE — DISCUSSION/SUMMARY
[Plan Review] : Plan Review [Adherent to treatment recommendations] : adherent to treatment recommendations [Cognitively intact] : cognitively intact [Motivated to participate in treatment] : motivated to participate in treatment [Motivated to maintain or improve physical health] : motivated to maintain or improve physical health [Part of a supportive family] : part of a supportive family [Involved in cultural/spiritual/Restorationism/community activities] : involved in cultural/spiritual/Restorationism/community activities [Housing stability] : housing stability [English fluency] : English fluency [Connected to healthcare] : connected to healthcare [Mental Health] : Mental Health [Continued - Progress made] : Continued - Progress made: [Revised - Rationale] : Revised - Rationale: [every ___ months] : every [unfilled] months [Attainment of higher level of functioning as evidenced by:] : Attainment of higher level of functioning as evidenced by: [None - Reason others did not participate:] : None - Reason others did not participate:  [Yes] : Yes [Psychiatric Provider/Prescriber] : Psychiatric Provider/Prescriber [Therapist] : Therapist [FreeTextEntry2] : 10/18/22 [FreeTextEntry3] : 12/13/10 [FreeTextEntry8] : None  [FreeTextEntry9] : None  [de-identified] : None  [FreeTextEntry1] : Patient has  long psychiatric history which includes 5 psychiatric hospital admissions.  [FreeTextEntry4] : " I want to take care of myself and utilize my time constructively" \par  [de-identified] : Patient wants to maintain stability. She is still having problems with her activity level, weight, and emotional eating.  [de-identified] : \par  I want to take care of myself and utilize my time constructively" \par Patient will go to gym at least 2 times a week and use exercise machine for at least 25 minutes at a time. \par Patient will go to gym at least 2 times a week and use exercise machine for at least 25 minutes at a time. \par Patient will go to the gym at least 2 times a week and/or use exercise machine at home for 25 minutes daily.  [de-identified] : 10/18/22 [de-identified] : Patient complete online AVE class and practice mindfulness exercises daily.  [de-identified] : 10/18/22 [de-identified] : Patient wants to use her time more constructively  [FreeTextEntry5] : Supportive therapy to hep patient maintain stability, learn positive coping methods, and utilize her time constructively.,  [de-identified] : Patient can be seen by a provider in the community to continue medication management  [de-identified] : P [de-identified] : Not clinically indicated

## 2022-11-09 ENCOUNTER — APPOINTMENT (OUTPATIENT)
Dept: PSYCHIATRY | Facility: CLINIC | Age: 47
End: 2022-11-09

## 2022-11-09 ENCOUNTER — APPOINTMENT (OUTPATIENT)
Dept: PSYCHIATRY | Facility: CLINIC | Age: 47
End: 2022-11-09
Payer: MEDICARE

## 2022-11-09 ENCOUNTER — OUTPATIENT (OUTPATIENT)
Dept: OUTPATIENT SERVICES | Facility: HOSPITAL | Age: 47
LOS: 1 days | Discharge: HOME | End: 2022-11-09

## 2022-11-09 DIAGNOSIS — F43.10 POST-TRAUMATIC STRESS DISORDER, UNSPECIFIED: ICD-10-CM

## 2022-11-09 DIAGNOSIS — F25.0 SCHIZOAFFECTIVE DISORDER, BIPOLAR TYPE: ICD-10-CM

## 2022-11-09 PROCEDURE — 99214 OFFICE O/P EST MOD 30 MIN: CPT | Mod: 95

## 2022-11-09 NOTE — REASON FOR VISIT
[Home] : at home, [unfilled] , at the time of the visit. [Medical Office: ([facility])___] : at the medical office located in  [Verbal consent obtained from patient] : the patient, [unfilled]

## 2022-11-09 NOTE — HISTORY OF PRESENT ILLNESS
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 Also she became a .  She sleeps well, uses CPAP machine.  She expresses 'good' mood, trying to consume a healthy diet and exercise. At bedtime, she ruminates about things she would like to engage in but has not been able to due to her 'disabilities'. She denies panic symptoms.  \par \par She experiences someone calling her name  but is able to ignore it. She denies psychotic symptoms.  She expresses her father can trigger her anxiety due to his verbal outbursts.  She is able to cope with intermittent flashbacks, denies recent nightmares or dissociative episodes.\par \par She incorporates meditation, enjoys reading and attends Caodaism sessions. She attends OA (overeaters anonymous), weekly and hopes to attend daily.  Her GI physician recommends weight loss prior to colonoscopy. She would like to get a mammogram and has scheduled follow up with gynecologist. She is adherent with medication regimen, denies EPS or side effects. She has been taking Benztropine at 7pm daily and would like to lower the dose.  At this time she denies thoughts of self harm, safety plan is discussed.  \par
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
## 2022-11-09 NOTE — PHYSICAL EXAM
[Talkative] : talkative [Normal] : normal [Fair] : fair [0] : 0 [No] : No [FreeTextEntry8] : 'good' [FreeTextEntry9] : Full [Well groomed] : well groomed [Average] : average [Cooperative] : cooperative [Euthymic] : euthymic [Full] : full [Clear] : clear [Overproductive] : overproductive [Linear/Goal Directed] : linear/goal directed [WNL] : within normal limits [FreeTextEntry1] : 0

## 2022-11-09 NOTE — PAST MEDICAL HISTORY
[FreeTextEntry1] : Five inpatient hospitalizations, last admission 2010 for voices, Two [facility] \par In the past, she experienced of periods of poor sleep, decreased need for sleep, exhibited pressured speech, heard voices, delusions of the devil and demonic forces.\par Trials of Saphris, Risperdal, Abilify, Geodone

## 2022-11-09 NOTE — DISCUSSION/SUMMARY
[FreeTextEntry1] : Patient is a 46yo  female, with history of Schizoaffective dx, PTSD.   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  She sleeps well,  'good' mood, trying to consume a healthy diet and exercise.  She has been taking Benztropine at 7pm daily and would like to lower the dose. She is at low risk, no attempts, adherent with medication regimen, positive family support, future oriented and linked to treatment. \par \par She self decreased Cogentin to 1mg po 7pm\par Trilafon 4mg am, 8mg at 7pm, 10pm\par Celexa 20mg po daily\par Trazodone 50mg po bedtime\par \par Phentermine 4mg am + 8mg pm \par Synthyroid 50mcg po daily\par Omeprazole 20mg po daily\par Flucitosone spray\par

## 2022-11-09 NOTE — RESULTS/DATA
[FreeTextEntry1] : PMD, Dr. Berman\par Labs 6/30/22:    CBC/ CMP normal.   TG 55/  Chol 194/  Hba1c 4.9\par EKG 6/2022:      QTc 426ms \par \par

## 2022-11-09 NOTE — CURRENT PSYCHIATRIC SYMPTOMS
[Highly Irritable] : no high irritability [Increased Activity] : no increased in activity [Distractibility] : not distracted [Grandeur] : no feelings of grandeur [Delusions] : no ~T delusions [Hallucination Visual] : no visual hallucinations [Thought Disorder] : ~T a thought disorder was not noted [Hallucination Auditory] : no auditory hallucinations

## 2022-11-30 ENCOUNTER — APPOINTMENT (OUTPATIENT)
Dept: PSYCHIATRY | Facility: CLINIC | Age: 47
End: 2022-11-30

## 2022-11-30 ENCOUNTER — OUTPATIENT (OUTPATIENT)
Dept: OUTPATIENT SERVICES | Facility: HOSPITAL | Age: 47
LOS: 1 days | Discharge: HOME | End: 2022-11-30

## 2022-11-30 DIAGNOSIS — F25.0 SCHIZOAFFECTIVE DISORDER, BIPOLAR TYPE: ICD-10-CM

## 2022-11-30 DIAGNOSIS — F43.10 POST-TRAUMATIC STRESS DISORDER, UNSPECIFIED: ICD-10-CM

## 2022-11-30 NOTE — PHYSICAL EXAM
[Cooperative] : cooperative [Euthymic] : euthymic [Labile] : labile [Clear] : clear [Linear/Goal Directed] : linear/goal directed [Grandiose] : grandiose [Average] : average [WNL] : within normal limits [Mild] : mild

## 2022-11-30 NOTE — REASON FOR VISIT
[Home] : at home, [unfilled] , at the time of the visit. [Medical Office: ([facility])___] : at the medical office located in  [Patient] : Patient [FreeTextEntry1] : Patient is a 47 year old female being seen for a followup Telehealth therapy session.

## 2022-11-30 NOTE — PLAN
[[facility]] : [facility]  [Supportive Therapy] : Supportive Therapy [FreeTextEntry2] : Goal #1 " I want to take care of myself,  utilize my time constructively, and be independent" \par \par Objective #1 .Patient will participate in the "[employer] Back to Work" Program. \par Objective #2  Patient attend online writing workshop or other positive activity. \par   [de-identified] : Patient reports feeling very good especially about meeting a asuncion on an online dating site. She reports that being able to connect again with a male of interest is a "big step" for her. She also reports other" big step" and progress with asserting herself towards her brother's jokes which she has been sensitive about for many years. She reports progress with her weight issue and eating habits since she has returned to OA online groups, is speaking to a sponsor, is using portion control, and not eating past 8 PM. She reports that she has lost 5 lbs and is not craving sweets even during Thanksgiving holiday. Spoke about mindfulness that is needed when it comes to her eating such as eating for physical reasons and not emotional. 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 She reports seeing the Podiatrist who told her that she has to wear Orthopedic shoes but does not need surgery at this time. She wants to find a remote job such as with [employer].  [Recommended Frequency of Visits: ____] : Recommended frequency of visits: [unfilled] [Return in ____ week(s)] : Return in [unfilled] week(s) [FreeTextEntry1] : Patient will focus on positive coping and self-care methods, finding an appropriate job, continue OA meetings, continue medication regimen, and followup with therapy after holidays, on 1/11/23.

## 2022-12-15 ENCOUNTER — APPOINTMENT (OUTPATIENT)
Dept: OBGYN | Facility: CLINIC | Age: 47
End: 2022-12-15

## 2022-12-15 VITALS
HEART RATE: 83 BPM | DIASTOLIC BLOOD PRESSURE: 80 MMHG | TEMPERATURE: 98 F | HEIGHT: 65 IN | WEIGHT: 293 LBS | BODY MASS INDEX: 48.82 KG/M2 | SYSTOLIC BLOOD PRESSURE: 122 MMHG

## 2022-12-15 DIAGNOSIS — G47.30 SLEEP APNEA, UNSPECIFIED: ICD-10-CM

## 2022-12-15 DIAGNOSIS — Z63.5 DISRUPTION OF FAMILY BY SEPARATION AND DIVORCE: ICD-10-CM

## 2022-12-15 DIAGNOSIS — R92.2 INCONCLUSIVE MAMMOGRAM: ICD-10-CM

## 2022-12-15 DIAGNOSIS — Z01.419 ENCOUNTER FOR GYNECOLOGICAL EXAMINATION (GENERAL) (ROUTINE) W/OUT ABNORMAL FINDINGS: ICD-10-CM

## 2022-12-15 DIAGNOSIS — Z12.39 ENCOUNTER FOR OTHER SCREENING FOR MALIGNANT NEOPLASM OF BREAST: ICD-10-CM

## 2022-12-15 LAB
BILIRUB UR QL STRIP: NEGATIVE
CLARITY UR: CLEAR
COLLECTION METHOD: NORMAL
GLUCOSE UR-MCNC: NEGATIVE
HCG UR QL: 0.2 EU/DL
HGB UR QL STRIP.AUTO: NORMAL
KETONES UR-MCNC: NEGATIVE
LEUKOCYTE ESTERASE UR QL STRIP: NEGATIVE
NITRITE UR QL STRIP: NEGATIVE
PH UR STRIP: 6
PROT UR STRIP-MCNC: NEGATIVE
SP GR UR STRIP: 1.01

## 2022-12-15 PROCEDURE — 99386 PREV VISIT NEW AGE 40-64: CPT

## 2022-12-15 PROCEDURE — 81003 URINALYSIS AUTO W/O SCOPE: CPT | Mod: QW

## 2022-12-15 SDOH — SOCIAL STABILITY - SOCIAL INSECURITY: DISRUPTION OF FAMILY BY SEPARATION AND DIVORCE: Z63.5

## 2022-12-15 NOTE — PHYSICAL EXAM
[Chaperone Present] : A chaperone was present in the examining room during all aspects of the physical examination [Appropriately responsive] : appropriately responsive [Alert] : alert [No Acute Distress] : no acute distress [Oriented x3] : oriented x3 [Examination Of The Breasts] : a normal appearance [No Masses] : no breast masses were palpable [Labia Majora] : normal [Labia Minora] : normal [Normal] : normal [Uterine Adnexae] : non-palpable

## 2022-12-15 NOTE — HISTORY OF PRESENT ILLNESS
[FreeTextEntry1] : new pt\par no complaints , never had Pap or mamography [N] : Patient denies prior pregnancies [LMPDate] : 12/3/22 [MensesFreq] : 30 [MensesLength] : 5 [MensesAmount] : mod [Regular Cycle Intervals] : periods have been regular [No] : Patient does not have concerns regarding sex [Previously active] : previously active [Men] : men [Vaginal] : vaginal [FreeTextEntry2] : not active since 2006

## 2022-12-19 LAB
APPEARANCE: CLEAR
BACTERIA UR CULT: NORMAL
BILIRUBIN URINE: NEGATIVE
BLOOD URINE: NEGATIVE
C TRACH RRNA SPEC QL NAA+PROBE: NOT DETECTED
COLOR: NORMAL
GLUCOSE QUALITATIVE U: NEGATIVE
HPV HIGH+LOW RISK DNA PNL CVX: NOT DETECTED
KETONES URINE: NEGATIVE
LEUKOCYTE ESTERASE URINE: NEGATIVE
N GONORRHOEA RRNA SPEC QL NAA+PROBE: NOT DETECTED
NITRITE URINE: NEGATIVE
PH URINE: 6.5
PROTEIN URINE: NEGATIVE
SOURCE AMPLIFICATION: NORMAL
SPECIFIC GRAVITY URINE: 1.01
UROBILINOGEN URINE: NORMAL

## 2022-12-29 ENCOUNTER — NON-APPOINTMENT (OUTPATIENT)
Age: 47
End: 2022-12-29

## 2023-01-11 ENCOUNTER — APPOINTMENT (OUTPATIENT)
Dept: PSYCHIATRY | Facility: CLINIC | Age: 48
End: 2023-01-11

## 2023-01-11 ENCOUNTER — OUTPATIENT (OUTPATIENT)
Dept: OUTPATIENT SERVICES | Facility: HOSPITAL | Age: 48
LOS: 1 days | Discharge: HOME | End: 2023-01-11

## 2023-01-11 DIAGNOSIS — F43.10 POST-TRAUMATIC STRESS DISORDER, UNSPECIFIED: ICD-10-CM

## 2023-01-11 DIAGNOSIS — F25.0 SCHIZOAFFECTIVE DISORDER, BIPOLAR TYPE: ICD-10-CM

## 2023-01-11 NOTE — REASON FOR VISIT
[Patient preference] : as per patient preference [Continuing, patient not seen in-person within last 12 months (provide details below)] : Telehealth services are continuing, patient not seen in-person within last 12 months.  [Telehealth (audio & video) - Individual/Group] : This visit was provided via telehealth using real-time 2-way audio visual technology. [Medical Office: ([facility])___] : The provider was located at the medical office in [unfilled]. [Home] : The patient, [unfilled], was located at home, [unfilled], at the time of the visit. [Verbal consent obtained from patient/other participant(s)] : Verbal consent for telehealth/telephonic services obtained from patient/other participant(s) [FreeTextEntry4] : 3:00 PM [FreeTextEntry5] : 3:30 PM [Patient] : Patient [FreeTextEntry1] : Patient is a 47 year old female being seen for a followup Telehealth  therapy session
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
## 2023-01-31 ENCOUNTER — APPOINTMENT (OUTPATIENT)
Dept: PSYCHIATRY | Facility: CLINIC | Age: 48
End: 2023-01-31
Payer: MEDICARE

## 2023-01-31 ENCOUNTER — OUTPATIENT (OUTPATIENT)
Dept: OUTPATIENT SERVICES | Facility: HOSPITAL | Age: 48
LOS: 1 days | Discharge: HOME | End: 2023-01-31

## 2023-01-31 DIAGNOSIS — F25.0 SCHIZOAFFECTIVE DISORDER, BIPOLAR TYPE: ICD-10-CM

## 2023-01-31 DIAGNOSIS — F43.10 POST-TRAUMATIC STRESS DISORDER, UNSPECIFIED: ICD-10-CM

## 2023-01-31 PROCEDURE — 99214 OFFICE O/P EST MOD 30 MIN: CPT | Mod: 95
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
## 2023-01-31 NOTE — DISCUSSION/SUMMARY
[FreeTextEntry1] : Nunu is a 46yo  female, with history of Schizoaffective dx, PTSD.  Her mood and anxiety symptoms are stable. She denies panic symptoms. She is at low risk, no acute risk of harm, adherent with medication regimen, positive family support, future oriented and linked to treatment. \par \par Trilafon 4mg am, 8mg at 7pm + 10pm\par Celexa 20mg po daily\par Trazodone 50mg po bedtime\par Benztropine 1mg po 7pm\par \par Synthyroid 50mcg po daily\par Omeprazole 20mg po daily\par Flucitosone spray\par
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
## 2023-01-31 NOTE — CURRENT PSYCHIATRIC SYMPTOMS
[Highly Irritable] : no high irritability [Increased Activity] : no increased in activity [Distractibility] : not distracted [Grandeur] : no feelings of grandeur [Delusions] : no ~T delusions [Hallucination Visual] : no visual hallucinations [Hallucination Auditory] : no auditory hallucinations [Thought Disorder] : ~T a thought disorder was not noted

## 2023-01-31 NOTE — PHYSICAL EXAM
[Well groomed] : well groomed [Average] : average [Cooperative] : cooperative [Euthymic] : euthymic [Full] : full [Clear] : clear [Overproductive] : overproductive [Linear/Goal Directed] : linear/goal directed [WNL] : within normal limits [0] : 0 [No] : No [FreeTextEntry1] : 0

## 2023-02-08 ENCOUNTER — APPOINTMENT (OUTPATIENT)
Age: 48
End: 2023-02-08

## 2023-02-08 ENCOUNTER — APPOINTMENT (OUTPATIENT)
Dept: PSYCHIATRY | Facility: CLINIC | Age: 48
End: 2023-02-08

## 2023-02-08 ENCOUNTER — OUTPATIENT (OUTPATIENT)
Dept: OUTPATIENT SERVICES | Facility: HOSPITAL | Age: 48
LOS: 1 days | End: 2023-02-08
Payer: MEDICARE

## 2023-02-08 DIAGNOSIS — Z00.8 ENCOUNTER FOR OTHER GENERAL EXAMINATION: ICD-10-CM

## 2023-02-08 DIAGNOSIS — F43.10 POST-TRAUMATIC STRESS DISORDER, UNSPECIFIED: ICD-10-CM

## 2023-02-08 DIAGNOSIS — F25.0 SCHIZOAFFECTIVE DISORDER, BIPOLAR TYPE: ICD-10-CM

## 2023-02-08 PROCEDURE — 90832 PSYTX W PT 30 MINUTES: CPT | Mod: 95

## 2023-02-08 NOTE — PLAN
[FreeTextEntry2] : Goal #1 " I want to take care of myself,  utilize my time constructively, and be independent" \par \par Objective #1 .Patient will participate in the "Ticket Back to Work" Program. \par Objective #2  Patient attend online writing workshop or other positive activity. \par   [Supportive Therapy] : Supportive Therapy [de-identified] : Patient reports feeling good and proud of herself for getting a job with [employer]. She reports taking a bus for the first time in a long time to [place] to register for class she needs to take to be certified before she starts working. She reports less anxiety about working this part time job since it is through "Ticket Back to Work", and she will not lose her disability benefits.She reports that her family has been responding well to her working and starting to do things for themselves, relying less on her.  She reports that she is still in contact with man she met online and he has not asked for any personal back account information which she will no longer give out. She reports that she has been fasting for the last three days due to Druze reasons, feeling that the devil wants to put negative thoughts in her mind. She denies any paranoid thoughts or feelings, or any self-harm thoughts. She feels safe and protected by god. She reports that she is feeling good after her 3 day fast and will begin to eat again tonight.  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

## 2023-02-08 NOTE — REASON FOR VISIT
[Patient preference] : as per patient preference [Continuing, patient not seen in-person within last 12 months (provide details below)] : Telehealth services are continuing, patient not seen in-person within last 12 months.  [Telehealth (audio & video) - Individual/Group] : This visit was provided via telehealth using real-time 2-way audio visual technology. [Medical Office: ([facility])___] : The provider was located at the medical office in [unfilled]. [Home] : The patient, [unfilled], was located at home, [unfilled], at the time of the visit. [Verbal consent obtained from patient/other participant(s)] : Verbal consent for telehealth/telephonic services obtained from patient/other participant(s) [FreeTextEntry4] : 3:00 PM [FreeTextEntry5] : 3:30 PM [Patient] : Patient [FreeTextEntry1] : Patient is a 47 year old female being  seen for a followup Telehealth therapy session.

## 2023-02-23 DIAGNOSIS — F43.10 POST-TRAUMATIC STRESS DISORDER, UNSPECIFIED: ICD-10-CM

## 2023-02-23 DIAGNOSIS — F25.0 SCHIZOAFFECTIVE DISORDER, BIPOLAR TYPE: ICD-10-CM

## 2023-03-06 ENCOUNTER — APPOINTMENT (OUTPATIENT)
Dept: OBGYN | Facility: CLINIC | Age: 48
End: 2023-03-06
Payer: SELF-PAY

## 2023-03-06 VITALS
WEIGHT: 293 LBS | HEIGHT: 65 IN | BODY MASS INDEX: 48.82 KG/M2 | DIASTOLIC BLOOD PRESSURE: 73 MMHG | SYSTOLIC BLOOD PRESSURE: 121 MMHG | HEART RATE: 79 BPM

## 2023-03-06 DIAGNOSIS — R87.619 UNSPECIFIED ABNORMAL CYTOLOGICAL FINDINGS IN SPECIMENS FROM CERVIX UTERI: ICD-10-CM

## 2023-03-06 PROCEDURE — 99213 OFFICE O/P EST LOW 20 MIN: CPT

## 2023-03-06 NOTE — HISTORY OF PRESENT ILLNESS
[FreeTextEntry1] : pt presents for repeat pap today, previous pap showed endometrial cells but exam was done close to the time of her menses [N] : Patient denies prior pregnancies [LMPDate] : 2/15/23 [MensesFreq] : 30 [MensesLength] : 5 [MensesAmount] : mod [Regular Cycle Intervals] : periods have been regular [No] : Patient does not have concerns regarding sex [Previously active] : previously active [Men] : men [Vaginal] : vaginal [FreeTextEntry2] : not active since 2006

## 2023-03-08 ENCOUNTER — APPOINTMENT (OUTPATIENT)
Dept: PSYCHIATRY | Facility: CLINIC | Age: 48
End: 2023-03-08

## 2023-03-08 ENCOUNTER — OUTPATIENT (OUTPATIENT)
Dept: OUTPATIENT SERVICES | Facility: HOSPITAL | Age: 48
LOS: 1 days | End: 2023-03-08
Payer: MEDICARE

## 2023-03-08 DIAGNOSIS — F43.10 POST-TRAUMATIC STRESS DISORDER, UNSPECIFIED: ICD-10-CM

## 2023-03-08 DIAGNOSIS — F25.0 SCHIZOAFFECTIVE DISORDER, BIPOLAR TYPE: ICD-10-CM

## 2023-03-08 DIAGNOSIS — Z00.8 ENCOUNTER FOR OTHER GENERAL EXAMINATION: ICD-10-CM

## 2023-03-08 PROCEDURE — 90832 PSYTX W PT 30 MINUTES: CPT | Mod: 95

## 2023-03-08 NOTE — PLAN
[FreeTextEntry2] : Goal #1 " I want to take care of myself,  utilize my time constructively, and be independent" \par \par Objective #1 .Patient will participate in the "Ticket Back to Work" Program. \par Objective #2  Patient attend online writing workshop or other positive activity. 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 She reports that she was also able to travel to [place] on her own, taking three buses, to get to the class. She reports that this helps her feel more confident and independent. She reports that she is now waiting to her from [facility] health aide agency to provide her a case or client. She reports that this is all through the Ticket back to work program. She is eager towards being independent from her family and reports desire to find her own apartment. Redirected patient to focus on work activity first and save money since getting her own apartment is a process and requires money. She reports that she has been feeling more comfortable with asserting herself to family and taking being as sensitive to their ways. She reports medication compliance and that medication regimen remains that same. She will be following up with psychiatrist, Dr. AGUILAR.  [Recommended Frequency of Visits: ____] : Recommended frequency of visits: [unfilled] [Return in ____ week(s)] : Return in [unfilled] week(s) [FreeTextEntry1] : Patient will focus on positive coping methods and ways to improve self-esteem and independence such as following up on work activity. She will continue medication regimen, continue monthly medication management, and followup with therapy on 4/12.

## 2023-03-08 NOTE — REASON FOR VISIT
[Home] : at home, [unfilled] , at the time of the visit. [Medical Office: ([facility])___] : at the medical office located in  [Patient preference] : as per patient preference [Telehealth (audio & video) - Individual/Group] : This visit was provided via telehealth using real-time 2-way audio visual technology. [Continuing, patient not seen in-person within last 12 months (provide details below)] : Telehealth services are continuing, patient not seen in-person within last 12 months.  [Verbal consent obtained from patient/other participant(s)] : Verbal consent for telehealth/telephonic services obtained from patient/other participant(s) [FreeTextEntry4] : 3:45 PM  [FreeTextEntry5] : 4:15 PM  [Patient] : Patient [FreeTextEntry1] : Patient is a 47 year old female being seen for a followup Telehealth therapy session.

## 2023-03-08 NOTE — PHYSICAL EXAM
[Cooperative] : cooperative [Euthymic] : euthymic [Full] : full [Clear] : clear [Grandiose] : grandiose [Linear/Goal Directed] : linear/goal directed [Average] : average [WNL] : within normal limits [Mild] : mild

## 2023-03-09 LAB — CYTOLOGY CVX/VAG DOC THIN PREP: NORMAL

## 2023-04-12 ENCOUNTER — OUTPATIENT (OUTPATIENT)
Dept: OUTPATIENT SERVICES | Facility: HOSPITAL | Age: 48
LOS: 1 days | End: 2023-04-12
Payer: MEDICARE

## 2023-04-12 ENCOUNTER — APPOINTMENT (OUTPATIENT)
Dept: PSYCHIATRY | Facility: CLINIC | Age: 48
End: 2023-04-12

## 2023-04-12 DIAGNOSIS — F43.10 POST-TRAUMATIC STRESS DISORDER, UNSPECIFIED: ICD-10-CM

## 2023-04-12 DIAGNOSIS — Z00.8 ENCOUNTER FOR OTHER GENERAL EXAMINATION: ICD-10-CM

## 2023-04-12 DIAGNOSIS — F25.0 SCHIZOAFFECTIVE DISORDER, BIPOLAR TYPE: ICD-10-CM

## 2023-04-12 PROCEDURE — 90832 PSYTX W PT 30 MINUTES: CPT | Mod: 95

## 2023-04-12 NOTE — REASON FOR VISIT
[Patient preference] : as per patient preference [Continuing, patient not seen in-person within last 12 months (provide details below)] : Telehealth services are continuing, patient not seen in-person within last 12 months.  [Telehealth (audio & video) - Individual/Group] : This visit was provided via telehealth using real-time 2-way audio visual technology. [Medical Office: ([facility])___] : The provider was located at the medical office in [unfilled]. [Home] : The patient, [unfilled], was located at home, [unfilled], at the time of the visit. [Verbal consent obtained from patient/other participant(s)] : Verbal consent for telehealth/telephonic services obtained from patient/other participant(s) [FreeTextEntry4] : 4:00 PM [FreeTextEntry5] : 4:30 PM [Patient] : Patient [FreeTextEntry1] : Patient is a 47 year old female seen for a followup Telehealth therapy session.

## 2023-04-12 NOTE — PLAN
[FreeTextEntry2] : Goal #1 " I want to take care of myself,  utilize my time constructively, and be independent" \par \par Objective #1 .Patient will participate in the "Ticket Back to Work" Program. \par Objective #2  Patient attend online writing workshop or other positive activity. 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 She reports that she had an evaluation with Social Security disability psychiatrist to review her case and was told that she no longer qualifies for benefits. She reports telling them that she is in the "Ticket Back to Work Program". She reports that she is not as worried about losing her Social Security Disability income since she now has a job, has been stable, and feels more confident.She reports progress she has made with help of therapy such working on her self-esteem, working on being less dependent, working on assertive communication, setting limits, and finally  getting a job.She expressed some shame in using poor judgement with men she meet through online dating site and being scammed again for money. She reports that she has now been too trusting. She addressed this with her brother. She reports that she will not be on the site or date men at this time.She reports medication compliance but missing or not scheduling a followup with her psychiatrist, Dr. Hein. She will do so today.  [Recommended Frequency of Visits: ____] : Recommended frequency of visits: [unfilled] [Return in ____ week(s)] : Return in [unfilled] week(s) [FreeTextEntry1] : Patient will focus on positive coping methods, continue to practice assertive communication, and start new job. She will continue medication regimen, call her psychiatrist, Dr. Hein to schedule a followup session, and call writer back to schedule, after she gets her work schedule.  FREE:[LAST:[YOUR PEDIATRICIAN],PHONE:[(   )    -],FAX:[(   )    -],FOLLOWUP:[1-3 Days]]

## 2023-04-12 NOTE — PHYSICAL EXAM
[Cooperative] : cooperative [Euthymic] : euthymic [Full] : full [Clear] : clear [Linear/Goal Directed] : linear/goal directed [Grandiose] : grandiose [Average] : average [WNL] : within normal limits [Mild] : mild

## 2023-05-10 ENCOUNTER — NON-APPOINTMENT (OUTPATIENT)
Age: 48
End: 2023-05-10

## 2023-05-19 ENCOUNTER — APPOINTMENT (OUTPATIENT)
Dept: PSYCHIATRY | Facility: CLINIC | Age: 48
End: 2023-05-19

## 2023-05-19 ENCOUNTER — OUTPATIENT (OUTPATIENT)
Dept: OUTPATIENT SERVICES | Facility: HOSPITAL | Age: 48
LOS: 1 days | End: 2023-05-19
Payer: MEDICARE

## 2023-05-19 DIAGNOSIS — F43.10 POST-TRAUMATIC STRESS DISORDER, UNSPECIFIED: ICD-10-CM

## 2023-05-19 DIAGNOSIS — F25.0 SCHIZOAFFECTIVE DISORDER, BIPOLAR TYPE: ICD-10-CM

## 2023-05-19 DIAGNOSIS — Z00.8 ENCOUNTER FOR OTHER GENERAL EXAMINATION: ICD-10-CM

## 2023-05-19 PROCEDURE — 90832 PSYTX W PT 30 MINUTES: CPT | Mod: 95
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
## 2023-05-19 NOTE — REASON FOR VISIT
[Patient preference] : as per patient preference [Continuing, patient not seen in-person within last 12 months (provide details below)] : Telehealth services are continuing, patient not seen in-person within last 12 months.  [Telehealth (audio & video) - Individual/Group] : This visit was provided via telehealth using real-time 2-way audio visual technology. [Medical Office: ([facility])___] : The provider was located at the medical office in [unfilled]. [Home] : The patient, [unfilled], was located at home, [unfilled], at the time of the visit. [Verbal consent obtained from patient/other participant(s)] : Verbal consent for telehealth/telephonic services obtained from patient/other participant(s) [Patient] : Patient [FreeTextEntry4] : 10:00 AM [FreeTextEntry5] : 10:30 AM [FreeTextEntry1] : Patient is a 47 year old female seen for a followup Telehealth therapy session.

## 2023-06-02 ENCOUNTER — APPOINTMENT (OUTPATIENT)
Dept: PSYCHIATRY | Facility: CLINIC | Age: 48
End: 2023-06-02
Payer: MEDICARE

## 2023-06-02 ENCOUNTER — OUTPATIENT (OUTPATIENT)
Dept: OUTPATIENT SERVICES | Facility: HOSPITAL | Age: 48
LOS: 1 days | End: 2023-06-02
Payer: MEDICARE

## 2023-06-02 DIAGNOSIS — F43.10 POST-TRAUMATIC STRESS DISORDER, UNSPECIFIED: ICD-10-CM

## 2023-06-02 DIAGNOSIS — F25.0 SCHIZOAFFECTIVE DISORDER, BIPOLAR TYPE: ICD-10-CM

## 2023-06-02 PROCEDURE — 99213 OFFICE O/P EST LOW 20 MIN: CPT | Mod: 95

## 2023-06-02 NOTE — DISCUSSION/SUMMARY
[FreeTextEntry1] : Nunu is a 48yo  female, with history of Schizoaffective dx, PTSD.  Her mood, anxiety symptoms are stable, she denies psychotic symptoms. She is at low risk, no acute risk of harm, adherent with medication regimen, positive family support, future oriented and linked to treatment. \par \par Trilafon 4mg am, 8mg at 7pm + 10pm\par Benztropine 1mg po 7pm\par Celexa 20mg po daily\par Trazodone 50mg po bedtime\par \par Synthyroid 50mcg po daily\par Omeprazole 20mg po daily\par Flucitosone spray\par
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
## 2023-06-02 NOTE — PLAN
[Medication education provided] : Medication education provided. [Rationale for medication choices, possible risks/precautions, benefits, alternative treatment choices, and consequences of non-treatment discussed] : Rationale for medication choices, possible risks/precautions, benefits, alternative treatment choices, and consequences of non-treatment discussed with patient/family/caregiver  [FreeTextEntry4] : \par She maintains stability of mood symptoms.\par She maintains stability of psychosis.

## 2023-06-02 NOTE — PHYSICAL EXAM
[Well groomed] : well groomed [Average] : average [Cooperative] : cooperative [Euthymic] : euthymic [Full] : full [Clear] : clear [Linear/Goal Directed] : linear/goal directed [WNL] : within normal limits [0] : 0 [No] : No

## 2023-06-03 DIAGNOSIS — F25.0 SCHIZOAFFECTIVE DISORDER, BIPOLAR TYPE: ICD-10-CM

## 2023-06-03 DIAGNOSIS — F43.10 POST-TRAUMATIC STRESS DISORDER, UNSPECIFIED: ICD-10-CM

## 2023-06-20 NOTE — REASON FOR VISIT
[FreeTextEntry1] : cc bph\par pt is  yo male referred for bph . The patient reports frequency, urgency and uui  incomplete emptying, intermittency, straining,weak stream. . He denies  hematuria and dysuria  The patient states symptoms are of moderate severity. The symptoms have been present for years . He reports a history of no complicating symptoms. He denies flank pain, gross hematuria, kidney stones and recurrent UTI.\par has chronic low back pain \par last psa 2.6\par The is no family history of prostate cancer\par started on tamsulosin but c/o congestion/decreased ejaculation/cloudiness/blood in stool \par \par doing well with daily tadalfil \par erections ok\par  [Home] : at home, [unfilled] , at the time of the visit. [Medical Office: ([facility])___] : at the medical office located in  [Verbal consent obtained from patient] : the patient, [unfilled]

## 2023-06-22 ENCOUNTER — NON-APPOINTMENT (OUTPATIENT)
Age: 48
End: 2023-06-22

## 2023-06-22 NOTE — DISCUSSION/SUMMARY
[FreeTextEntry1] : Therapist reached out to schedule appointment with patient.  Therapist left a message in her voicemail.  The house phone did not have a voicemail.

## 2023-06-26 ENCOUNTER — NON-APPOINTMENT (OUTPATIENT)
Age: 48
End: 2023-06-26

## 2023-06-26 NOTE — DISCUSSION/SUMMARY
[FreeTextEntry1] : Therapist reached out to patient to schedule appointment.  There was no answer.  Therapist left a voicemail.  The [phone] number does not have a voicemail.

## 2023-06-30 ENCOUNTER — APPOINTMENT (OUTPATIENT)
Dept: PSYCHIATRY | Facility: CLINIC | Age: 48
End: 2023-06-30

## 2023-06-30 ENCOUNTER — OUTPATIENT (OUTPATIENT)
Dept: OUTPATIENT SERVICES | Facility: HOSPITAL | Age: 48
LOS: 1 days | End: 2023-06-30
Payer: MEDICARE

## 2023-06-30 DIAGNOSIS — F43.10 POST-TRAUMATIC STRESS DISORDER, UNSPECIFIED: ICD-10-CM

## 2023-06-30 DIAGNOSIS — F25.0 SCHIZOAFFECTIVE DISORDER, BIPOLAR TYPE: ICD-10-CM

## 2023-06-30 PROCEDURE — 90832 PSYTX W PT 30 MINUTES: CPT | Mod: 95

## 2023-06-30 NOTE — REASON FOR VISIT
[Patient preference] : as per patient preference [Telehealth (audio & video) - Individual/Group] : This visit was provided via telehealth using real-time 2-way audio visual technology. [Medical Office: ([facility])___] : The provider was located at the medical office in [unfilled]. [Home] : The patient, [unfilled], was located at home, [unfilled], at the time of the visit. [Verbal consent obtained from patient/other participant(s)] : Verbal consent for telehealth/telephonic services obtained from patient/other participant(s) [FreeTextEntry4] : 10:30 AM [FreeTextEntry5] : 11:00 AM [Patient] : Patient

## 2023-06-30 NOTE — PLAN
[FreeTextEntry2] :  Goal #1 " I want to take care of myself, utilize my time constructively, and be independent" \par \par Objective #1.Patient will participate in the "Ticket Back to Work" Program. \par Objective #2 Patient attend online writing workshop or other positive activity.  [Cognitive and/or Behavior Therapy] : Cognitive and/or Behavior Therapy  [Motivational Interviewing] : Motivational Interviewing  [Supportive Therapy] : Supportive Therapy [de-identified] : Patient was transferred to therapist and this is her first session with therapist.  Therapist used  (CBT) cognitive behavior therapy, (MT) Motivational Interviewing techniques, and psychoeducation to engage with patient. Therapist worked on building rapport with patient.  Therapist obtained information and conducted a therapeutic alliance with patient.  Patient provided a brief info about her past and credits her carlos for her accomplishments thus far.  Patient is also grateful to previous therapist who helped her through some tough times.  Patient is currently working and feeling good.  Patient agreed to work on reviewing her treatment plan at the next session.\par  [Recommended Frequency of Visits: ____] : Recommended frequency of visits: [unfilled] [Return in ____ week(s)] : Return in [unfilled] week(s) [FreeTextEntry1] : Patient agreed to meet in two weeks for the next session.

## 2023-07-01 DIAGNOSIS — F25.0 SCHIZOAFFECTIVE DISORDER, BIPOLAR TYPE: ICD-10-CM

## 2023-07-01 DIAGNOSIS — F43.10 POST-TRAUMATIC STRESS DISORDER, UNSPECIFIED: ICD-10-CM

## 2023-07-14 ENCOUNTER — OUTPATIENT (OUTPATIENT)
Dept: OUTPATIENT SERVICES | Facility: HOSPITAL | Age: 48
LOS: 1 days | End: 2023-07-14
Payer: MEDICARE

## 2023-07-14 ENCOUNTER — APPOINTMENT (OUTPATIENT)
Dept: PSYCHIATRY | Facility: CLINIC | Age: 48
End: 2023-07-14

## 2023-07-14 DIAGNOSIS — F43.10 POST-TRAUMATIC STRESS DISORDER, UNSPECIFIED: ICD-10-CM

## 2023-07-14 DIAGNOSIS — F25.0 SCHIZOAFFECTIVE DISORDER, BIPOLAR TYPE: ICD-10-CM

## 2023-07-14 PROCEDURE — 90832 PSYTX W PT 30 MINUTES: CPT | Mod: 95

## 2023-07-14 NOTE — PHYSICAL EXAM
[Euthymic] : euthymic [Full] : full [Clear] : clear [Linear/Goal Directed] : linear/goal directed [WNL] : within normal limits

## 2023-07-14 NOTE — PLAN
[FreeTextEntry2] :  Goal #1 " I want to take care of myself, utilize my time constructively, and be independent" \par \par Objective #1.Patient will participate in the "Ticket Back to Work" Program. \par Objective #2 Patient attend online writing workshop or other positive activity.  [Cognitive and/or Behavior Therapy] : Cognitive and/or Behavior Therapy  [Motivational Interviewing] : Motivational Interviewing  [Supportive Therapy] : Supportive Therapy [de-identified] : Therapist provided an individual psychotherapy session with patient. Therapist used cognitive behavior therapy (CBT) motivational Interviewing (MI) and supportive psychotherapy (SP) techniques. Patient was engaged in session. \par \par Patient processed feelings and events experienced since last session. Patient reported feeling good and had no concerns. Therapist helped patient do a screening for depression, anxiety and stress.  All results came out normal with revealing no symptoms of stress, depression or anxiety.  Therapist reviewed patient's treatment plan from previous therapist.  Patient reported that the objectives are not valid anymore, because instead of going to the ticket back to work program, she got a full time job and she is happy with it.  She also stated that she may pursue writing in the future, but does not need assistance in pursuing it.  Patient explained that she continues to hear voices on a regular basis,  but she has learned coping skills to deal with voices. Patient states that although she has her sister to speak to, a new boyfriend, she depends on her carlos to help her. Patient states the talks to God all the time.   When she hears voices , she reaches for her bible and she reads and finds that she is able to go to sleep.  Patient stated that she prays and Synagogue and find that that is her help.  Patient reports compliance with medication. Patient has no health concerns at this time that she wants to work on.  Patient was unable to think of new treatment goals, and does not wish to revisit the past.  She agrees that she is doing well and wishes to stop therapy at this time.\par \par Patient is future oriented, continues to have protective factors, did not endorse suicide ideation or attempt. Therapist praised patient for progress made towards goal and encouraged continued growth.\par  [Return in ____ week(s)] : Return in [unfilled] week(s) [FreeTextEntry1] : Patient plans on meeting with Dr. Hein the end of August.  When Asked to set up one more exploratory session, patient declined and agreed to return to therapy in the future if she has a need. Patient thanked therapist for meeting with her.

## 2023-07-14 NOTE — REASON FOR VISIT
[Patient preference] : as per patient preference [Telehealth (audio & video) - Individual/Group] : This visit was provided via telehealth using real-time 2-way audio visual technology. [Medical Office: ([facility])___] : The provider was located at the medical office in [unfilled]. [Home] : The patient, [unfilled], was located at home, [unfilled], at the time of the visit. [Verbal consent obtained from patient/other participant(s)] : Verbal consent for telehealth/telephonic services obtained from patient/other participant(s) [FreeTextEntry4] : 10:00 AM [FreeTextEntry5] : 10:30 AM [Patient] : Patient

## 2023-07-15 DIAGNOSIS — F25.0 SCHIZOAFFECTIVE DISORDER, BIPOLAR TYPE: ICD-10-CM

## 2023-07-15 DIAGNOSIS — F43.10 POST-TRAUMATIC STRESS DISORDER, UNSPECIFIED: ICD-10-CM

## 2023-08-25 ENCOUNTER — OUTPATIENT (OUTPATIENT)
Dept: OUTPATIENT SERVICES | Facility: HOSPITAL | Age: 48
LOS: 1 days | End: 2023-08-25
Payer: SELF-PAY

## 2023-08-25 ENCOUNTER — APPOINTMENT (OUTPATIENT)
Dept: PSYCHIATRY | Facility: CLINIC | Age: 48
End: 2023-08-25
Payer: SELF-PAY

## 2023-08-25 DIAGNOSIS — F25.0 SCHIZOAFFECTIVE DISORDER, BIPOLAR TYPE: ICD-10-CM

## 2023-08-25 DIAGNOSIS — F43.10 POST-TRAUMATIC STRESS DISORDER, UNSPECIFIED: ICD-10-CM

## 2023-08-25 PROCEDURE — 99213 OFFICE O/P EST LOW 20 MIN: CPT | Mod: 95
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
## 2023-08-25 NOTE — REASON FOR VISIT
[Home] : at home, [unfilled] , at the time of the visit. [Medical Office: ([facility])___] : at the medical office located in  [Verbal consent obtained from patient] : the patient, [unfilled]
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
## 2023-08-25 NOTE — RESULTS/DATA
[FreeTextEntry1] : PMD, Dr. Berman Labs 6/30/22:    CBC, CMP normal,  TG 55/  Chol 194/  Hba1c 4.9 EKG 6/2022:      QTc 426ms
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
## 2023-08-25 NOTE — PHYSICAL EXAM
[None] : none [Well groomed] : well groomed [Average] : average [Cooperative] : cooperative [Euthymic] : euthymic [Full] : full [Clear] : clear [Linear/Goal Directed] : linear/goal directed [Hallucinations - Auditory] : hallucinations - auditory [WNL] : within normal limits [0] : 0 [No] : No [FreeTextEntry1] : Smiling intermittently during session [de-identified] : Voice calling her name intermittently

## 2023-08-25 NOTE — PHYSICAL EXAM
[None] : none [Well groomed] : well groomed [Average] : average [Cooperative] : cooperative [Euthymic] : euthymic [Full] : full [Clear] : clear [Linear/Goal Directed] : linear/goal directed [Hallucinations - Auditory] : hallucinations - auditory [WNL] : within normal limits [0] : 0 [No] : No [FreeTextEntry1] : Smiling intermittently during session [de-identified] : Voice calling her name intermittently
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
## 2023-08-26 DIAGNOSIS — F43.10 POST-TRAUMATIC STRESS DISORDER, UNSPECIFIED: ICD-10-CM

## 2023-08-26 DIAGNOSIS — F25.0 SCHIZOAFFECTIVE DISORDER, BIPOLAR TYPE: ICD-10-CM

## 2023-11-17 ENCOUNTER — APPOINTMENT (OUTPATIENT)
Dept: PSYCHIATRY | Facility: CLINIC | Age: 48
End: 2023-11-17
Payer: SELF-PAY

## 2023-11-17 ENCOUNTER — OUTPATIENT (OUTPATIENT)
Dept: OUTPATIENT SERVICES | Facility: HOSPITAL | Age: 48
LOS: 1 days | End: 2023-11-17
Payer: MEDICARE

## 2023-11-17 DIAGNOSIS — F25.0 SCHIZOAFFECTIVE DISORDER, BIPOLAR TYPE: ICD-10-CM

## 2023-11-17 DIAGNOSIS — F43.10 POST-TRAUMATIC STRESS DISORDER, UNSPECIFIED: ICD-10-CM

## 2023-11-17 PROCEDURE — 99214 OFFICE O/P EST MOD 30 MIN: CPT | Mod: 95

## 2023-11-18 DIAGNOSIS — F43.10 POST-TRAUMATIC STRESS DISORDER, UNSPECIFIED: ICD-10-CM

## 2023-11-18 DIAGNOSIS — F25.0 SCHIZOAFFECTIVE DISORDER, BIPOLAR TYPE: ICD-10-CM

## 2023-12-31 NOTE — DISCUSSION/SUMMARY
[Annual Review of Systems Completed?] : Annual Review of Systems Completed: Yes [Tobacco Screening Completed?] : Tobacco Screening Completed: Yes [Date of Last Annual Labs: _____] : Date of Last Annual Labs: [unfilled] [Date of Last HbgA1c: _____] : Date of Last HbgA1c: [unfilled] [Date of Last Lipid Profile: _____] : Date of Last Lipid Profile: [unfilled] [Date of Last AIMS: _____] : Date of Last AIMS: [unfilled] [Plan Review] : Plan Review [Adherent to treatment recommendations] : adherent to treatment recommendations [Articulate] : articulate [Cognitively intact] : cognitively intact [Motivated to participate in treatment] : motivated to participate in treatment [Motivated to maintain or improve physical health] : motivated to maintain or improve physical health [Part of a supportive family] : part of a supportive family [Involved in cultural/spiritual/Episcopal/community activities] : involved in cultural/spiritual/Episcopal/community activities [Housing stability] : housing stability [English fluency] : English fluency [Connected to healthcare] : connected to healthcare [Continued - Progress made] : Continued - Progress made: [Mental Health] : Mental Health [Other: ___] : [unfilled] [Attainment of higher level of functioning as evidenced by:] : Attainment of higher level of functioning as evidenced by: [None - Reason others did not participate:] : None - Reason others did not participate:  [Yes] : Yes [Psychiatric Provider/Prescriber] : Psychiatric Provider/Prescriber [FreeTextEntry2] : 8/2024 [FreeTextEntry3] : 12/13/22 [FreeTextEntry8] : None  [FreeTextEntry9] : None  [de-identified] : None  [FreeTextEntry4] : " I want to take care of myself and utilize my time constructively"   [de-identified] : Patient is focusing on work.  [FreeTextEntry5] :  She is adherent with medication regimen.  [FreeTextEntry1] : Psychosis [de-identified] : Patient can be treated by a provider in the community for medication management  [de-identified] : She denies exacerbation of psychotic symptoms.  [de-identified] : Not clinically indicated

## 2023-12-31 NOTE — DISCUSSION/SUMMARY
[Annual Review of Systems Completed?] : Annual Review of Systems Completed: Yes [Tobacco Screening Completed?] : Tobacco Screening Completed: Yes [Date of Last Annual Labs: _____] : Date of Last Annual Labs: [unfilled] [Date of Last HbgA1c: _____] : Date of Last HbgA1c: [unfilled] [Date of Last Lipid Profile: _____] : Date of Last Lipid Profile: [unfilled] [Date of Last AIMS: _____] : Date of Last AIMS: [unfilled] [Plan Review] : Plan Review [Adherent to treatment recommendations] : adherent to treatment recommendations [Articulate] : articulate [Cognitively intact] : cognitively intact [Motivated to participate in treatment] : motivated to participate in treatment [Motivated to maintain or improve physical health] : motivated to maintain or improve physical health [Part of a supportive family] : part of a supportive family [Involved in cultural/spiritual/Confucianist/community activities] : involved in cultural/spiritual/Confucianist/community activities [Housing stability] : housing stability [English fluency] : English fluency [Connected to healthcare] : connected to healthcare [Continued - Progress made] : Continued - Progress made: [Mental Health] : Mental Health [Other: ___] : [unfilled] [Attainment of higher level of functioning as evidenced by:] : Attainment of higher level of functioning as evidenced by: [None - Reason others did not participate:] : None - Reason others did not participate:  [Yes] : Yes [Psychiatric Provider/Prescriber] : Psychiatric Provider/Prescriber [FreeTextEntry2] : 8/2024 [FreeTextEntry3] : 12/13/22 [FreeTextEntry8] : None  [FreeTextEntry9] : None  [de-identified] : None  [FreeTextEntry4] : " I want to take care of myself and utilize my time constructively"   [de-identified] : Patient is focusing on work.  [FreeTextEntry5] :  She is adherent with medication regimen.  [FreeTextEntry1] : Psychosis [de-identified] : She denies exacerbation of psychotic symptoms.  [de-identified] : Patient can be treated by a provider in the community for medication management  [de-identified] : Not clinically indicated

## 2024-01-25 ENCOUNTER — NON-APPOINTMENT (OUTPATIENT)
Age: 49
End: 2024-01-25

## 2024-01-26 ENCOUNTER — NON-APPOINTMENT (OUTPATIENT)
Age: 49
End: 2024-01-26

## 2024-01-26 ENCOUNTER — OUTPATIENT (OUTPATIENT)
Dept: OUTPATIENT SERVICES | Facility: HOSPITAL | Age: 49
LOS: 1 days | End: 2024-01-26
Payer: COMMERCIAL

## 2024-01-26 ENCOUNTER — APPOINTMENT (OUTPATIENT)
Dept: PSYCHIATRY | Facility: CLINIC | Age: 49
End: 2024-01-26
Payer: COMMERCIAL

## 2024-01-26 DIAGNOSIS — F43.10 POST-TRAUMATIC STRESS DISORDER, UNSPECIFIED: ICD-10-CM

## 2024-01-26 DIAGNOSIS — F25.0 SCHIZOAFFECTIVE DISORDER, BIPOLAR TYPE: ICD-10-CM

## 2024-01-26 PROCEDURE — 99214 OFFICE O/P EST MOD 30 MIN: CPT | Mod: 95
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
## 2024-01-26 NOTE — REASON FOR VISIT
[Home] : at home, [unfilled] , at the time of the visit. [Other Location: e.g. Home (Enter Location, City,State)___] : at [unfilled] [Verbal consent obtained from patient] : the patient, [unfilled] [Patient] : Patient [FreeTextEntry1] : Follow up for mood, psychotic symptoms
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
## 2024-01-26 NOTE — PHYSICAL EXAM
[None] : none [Average] : average [Cooperative] : cooperative [Euthymic] : euthymic [Full] : full [Clear] : clear [Linear/Goal Directed] : linear/goal directed [Hallucinations - Auditory] : hallucinations - auditory [WNL] : within normal limits [FreeTextEntry8] : 'Im doing good' [de-identified] : intermittent voices

## 2024-01-26 NOTE — RESULTS/DATA
[FreeTextEntry1] : Labs 6/30/22:   CBC, CMP normal,  TG 55/  Chol 194/  Hba1c 4.9 EKG 6/2022:    QTc 426ms

## 2024-01-27 DIAGNOSIS — F25.0 SCHIZOAFFECTIVE DISORDER, BIPOLAR TYPE: ICD-10-CM

## 2024-01-27 DIAGNOSIS — F43.10 POST-TRAUMATIC STRESS DISORDER, UNSPECIFIED: ICD-10-CM

## 2024-01-31 ENCOUNTER — OUTPATIENT (OUTPATIENT)
Dept: OUTPATIENT SERVICES | Facility: HOSPITAL | Age: 49
LOS: 1 days | End: 2024-01-31
Payer: COMMERCIAL

## 2024-01-31 ENCOUNTER — APPOINTMENT (OUTPATIENT)
Dept: PSYCHIATRY | Facility: CLINIC | Age: 49
End: 2024-01-31

## 2024-01-31 DIAGNOSIS — F25.0 SCHIZOAFFECTIVE DISORDER, BIPOLAR TYPE: ICD-10-CM

## 2024-01-31 DIAGNOSIS — F43.10 POST-TRAUMATIC STRESS DISORDER, UNSPECIFIED: ICD-10-CM

## 2024-01-31 PROCEDURE — 90834 PSYTX W PT 45 MINUTES: CPT

## 2024-01-31 NOTE — PLAN
[Cognitive and/or Behavior Therapy] : Cognitive and/or Behavior Therapy  [Guild Therapy] : Guild Therapy  [Motivational Interviewing] : Motivational Interviewing  [Psychoeducation] : Psychoeducation  [Supportive Therapy] : Supportive Therapy [Recommended Frequency of Visits: ____] : Recommended frequency of visits: [unfilled] [Return in ____ week(s)] : Return in [unfilled] week(s) [FreeTextEntry2] : Patient was referred to therapist. Treatment plan to be developed in coming sessions. [de-identified] : Session began at 3:10pm and ended at 4:00pm. Patient was seen in-person for initial session with therapist. Patient reported that she has been doing well since she last met with a therapist. She reports she has been a long term patient at the clinic and identified ways in which she has grown since her admission here and  her . Patient endorses that she still hears voices "from time to time" but they are not overpowering and she is able to ignore/block them out. 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 Therapist and patient explored different ways to handle the situation at hand. Patient also notes she is participating in more self care for herself and not listening to the negativity she often receives from her family. Therapist commended patient on her reported progress and encouraged patient to participate in activities that make her happy. [FreeTextEntry1] : Patient will continue to attend therapy to discuss symptoms and management of these symptoms. Patient will contact therapist if they are experiencing an increase in symptoms or difficulty in managing.

## 2024-02-01 DIAGNOSIS — F25.0 SCHIZOAFFECTIVE DISORDER, BIPOLAR TYPE: ICD-10-CM

## 2024-02-01 DIAGNOSIS — F43.10 POST-TRAUMATIC STRESS DISORDER, UNSPECIFIED: ICD-10-CM

## 2024-02-16 ENCOUNTER — OUTPATIENT (OUTPATIENT)
Dept: OUTPATIENT SERVICES | Facility: HOSPITAL | Age: 49
LOS: 1 days | End: 2024-02-16
Payer: COMMERCIAL

## 2024-02-16 ENCOUNTER — APPOINTMENT (OUTPATIENT)
Dept: PSYCHIATRY | Facility: CLINIC | Age: 49
End: 2024-02-16

## 2024-02-16 DIAGNOSIS — F43.10 POST-TRAUMATIC STRESS DISORDER, UNSPECIFIED: ICD-10-CM

## 2024-02-16 DIAGNOSIS — F25.0 SCHIZOAFFECTIVE DISORDER, BIPOLAR TYPE: ICD-10-CM

## 2024-02-16 PROCEDURE — 90832 PSYTX W PT 30 MINUTES: CPT

## 2024-02-16 NOTE — REASON FOR VISIT
[Patient preference] : as per patient preference [Starting, patient seen in-person within last 6 months] : Telehealth services are being started as patient has seen in-person within last 6 months. [Telehealth (audio & video) - Individual/Group] : This visit was provided via telehealth using real-time 2-way audio visual technology. [Medical Office: ([facility])___] : The provider was located at the medical office in [unfilled]. [Other Location: e.g. Home (Enter Location, City,State)___] : The patient, [unfilled], was located at [unfilled] at the time of the visit. [Patient] : Patient [FreeTextEntry4] : 9:34am [FreeTextEntry5] : 10:04am [FreeTextEntry2] : 01/31/2024 [FreeTextEntry1] : Psychotherapy follow up.
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
## 2024-02-16 NOTE — END OF VISIT
[Individual Psychotherapy for 16-37 minutes] : Individual Psychotherapy for 16-37 minutes [Teletherapy Service Provided] : The services provided in this session were delivered via tele-therapy [Duration of Psychotherapy Visit (minutes spent in synchronous communication): ____] : Duration of Psychotherapy Visit (minutes spent in synchronous communication): [unfilled] [FreeTextEntry3] : car parked outside [facility], [address], [place] [address] [FreeTextEntry5] : [address], [place] [address]

## 2024-02-17 DIAGNOSIS — F25.0 SCHIZOAFFECTIVE DISORDER, BIPOLAR TYPE: ICD-10-CM

## 2024-02-17 DIAGNOSIS — F43.10 POST-TRAUMATIC STRESS DISORDER, UNSPECIFIED: ICD-10-CM

## 2024-02-29 ENCOUNTER — APPOINTMENT (OUTPATIENT)
Dept: PSYCHIATRY | Facility: CLINIC | Age: 49
End: 2024-02-29

## 2024-02-29 ENCOUNTER — OUTPATIENT (OUTPATIENT)
Dept: OUTPATIENT SERVICES | Facility: HOSPITAL | Age: 49
LOS: 1 days | End: 2024-02-29
Payer: COMMERCIAL

## 2024-02-29 DIAGNOSIS — F43.10 POST-TRAUMATIC STRESS DISORDER, UNSPECIFIED: ICD-10-CM

## 2024-02-29 DIAGNOSIS — F25.0 SCHIZOAFFECTIVE DISORDER, BIPOLAR TYPE: ICD-10-CM

## 2024-02-29 PROCEDURE — 90834 PSYTX W PT 45 MINUTES: CPT

## 2024-02-29 NOTE — REASON FOR VISIT
[Telehealth (audio & video) - Individual/Group] : This visit was provided via telehealth using real-time 2-way audio visual technology. [Starting, patient seen in-person within last 6 months] : Telehealth services are being started as patient has seen in-person within last 6 months. [Medical Office: ([facility])___] : The provider was located at the medical office in [unfilled]. [Home] : The patient, [unfilled], was located at home, [unfilled], at the time of the visit. [Verbal consent obtained from patient/other participant(s)] : Verbal consent for telehealth/telephonic services obtained from patient/other participant(s) [Patient] : Patient [FreeTextEntry4] : 2:25pm [FreeTextEntry5] : 3:15pm [FreeTextEntry2] : 1/31/24 [FreeTextEntry1] : Psychotherapy follow up.

## 2024-02-29 NOTE — PHYSICAL EXAM
[Individual reports tobacco use during the last 30 days?] : Individual reports tobacco use during the last 30 days? No

## 2024-02-29 NOTE — REASON FOR VISIT
[Telehealth (audio & video) - Individual/Group] : This visit was provided via telehealth using real-time 2-way audio visual technology. [Starting, patient seen in-person within last 6 months] : Telehealth services are being started as patient has seen in-person within last 6 months. [Medical Office: ([facility])___] : The provider was located at the medical office in [unfilled]. [Home] : The patient, [unfilled], was located at home, [unfilled], at the time of the visit. [Verbal consent obtained from patient/other participant(s)] : Verbal consent for telehealth/telephonic services obtained from patient/other participant(s) [Patient] : Patient [FreeTextEntry5] : 3:15pm [FreeTextEntry4] : 2:25pm [FreeTextEntry2] : 1/31/24 [FreeTextEntry1] : Psychotherapy follow up.

## 2024-02-29 NOTE — END OF VISIT
[Individual Psychotherapy for 38-52 minutes] : Individual Psychotherapy for 38 - 52 minutes [Duration of Psychotherapy Visit (minutes spent in synchronous communication): ____] : Duration of Psychotherapy Visit (minutes spent in synchronous communication): [unfilled] [Teletherapy Service Provided] : The services provided in this session were delivered via tele-therapy [FreeTextEntry3] : [address] [facility] [place], [place] [address] [FreeTextEntry5] : [address], [place] [address]

## 2024-02-29 NOTE — DISCUSSION/SUMMARY
[Initial Plan] : Initial Plan [Able to manage surrounding demands and opportunities] : able to manage surrounding demands and opportunities [Able to exercise self-direction] : able to exercise self-direction [Able to set and pursue goals] : able to set and pursue goals [Adherent to treatment recommendations] : adherent to treatment recommendations [Articulate] : articulate [Cognitively intact] : cognitively intact [Good impulse control] : good impulse control [Insightful] : insightful [Creative] : creative [Intelligent] : intelligent [Motivated to participate in treatment] : motivated to participate in treatment [Motivated and ready for change] : motivated and ready for change [Health literacy] : health literacy [Motivated to maintain or improve physical health] : motivated to maintain or improve physical health [Financially stable] : financially stable [In good health] : in good health [Has vocational interests or hobbies] : has vocational interests or hobbies [Involved in cultural/spiritual/Jain/community activities] : involved in cultural/spiritual/Jain/community activities [Steady employment] : steady employment [English fluency] : English fluency [Housing stability] : housing stability [Access to safe outdoor spaces] : access to safe outdoor spaces [Connected to healthcare] : connected to healthcare [Mental Health] : Mental Health [FreeTextEntry2] : 3/1/25 [FreeTextEntry3] : 12/13/2010 [FreeTextEntry8] : None reported. [FreeTextEntry9] : Patient is practicing Hoahaoism and utilizes pray daily. [Physical Health] : Physical Health [Initial] : Initial [every ___ weeks] : every [unfilled] weeks [every ___ months] : every [unfilled] months [A change in level of care is needed as evidenced by:] : A change in level of care is needed as evidenced by: [Other rationale for transition/discharge:] : Other rationale for transition/discharge: [None - Reason others did not participate:] : None - Reason others did not participate:  [Yes] : Yes [Psychiatric Provider/Prescriber] : Psychiatric Provider/Prescriber [Supervisor (if needed)] : Supervisor [de-identified] : This is a prioritized, short-term goal.  [FreeTextEntry1] : Patient will maintain overall physical health and well being, and will address medical needs as necessary. [FreeTextEntry4] : "I want to take better care of myself and respect and love myself from now on." [de-identified] : Patient will attend all medical appointments as scheduled.  [de-identified] : Patient will exercise at least once a week to be more physically fit.  [de-identified] : 3/1/25 [FreeTextEntry5] : Therapist will use Motivational Interviewing and psychoeducation to assist patient in meeting their goals.  [de-identified] : 3/1/25 [de-identified] : Yolis Armstrong LMSW [de-identified] : Dr. Hein [de-identified] : Not clinically indicated. [de-identified] : If patient is unable to perform activities of daily living and/or expresses suicidal ideation, a higher level of care will be considered. [de-identified] : When patient no longer requires individual psychotherapy and medication in order to perform at baseline, the patient will be discharged.

## 2024-02-29 NOTE — END OF VISIT
[Individual Psychotherapy for 38-52 minutes] : Individual Psychotherapy for 38 - 52 minutes [Duration of Psychotherapy Visit (minutes spent in synchronous communication): ____] : Duration of Psychotherapy Visit (minutes spent in synchronous communication): [unfilled] [Teletherapy Service Provided] : The services provided in this session were delivered via tele-therapy [FreeTextEntry3] : [address] [facility], [place] [address] [FreeTextEntry5] : [address], [place] [address]

## 2024-02-29 NOTE — DISCUSSION/SUMMARY
[Initial Plan] : Initial Plan [Able to manage surrounding demands and opportunities] : able to manage surrounding demands and opportunities [Able to exercise self-direction] : able to exercise self-direction [Able to set and pursue goals] : able to set and pursue goals [Adherent to treatment recommendations] : adherent to treatment recommendations [Articulate] : articulate [Cognitively intact] : cognitively intact [Good impulse control] : good impulse control [Insightful] : insightful [Creative] : creative [Intelligent] : intelligent [Motivated to participate in treatment] : motivated to participate in treatment [Motivated and ready for change] : motivated and ready for change [Health literacy] : health literacy [Motivated to maintain or improve physical health] : motivated to maintain or improve physical health [In good health] : in good health [Financially stable] : financially stable [Has vocational interests or hobbies] : has vocational interests or hobbies [Involved in cultural/spiritual/Methodist/community activities] : involved in cultural/spiritual/Methodist/community activities [Steady employment] : steady employment [English fluency] : English fluency [Housing stability] : housing stability [Connected to healthcare] : connected to healthcare [Access to safe outdoor spaces] : access to safe outdoor spaces [Mental Health] : Mental Health [FreeTextEntry2] : 3/1/25 [FreeTextEntry3] : 12/13/2010 [FreeTextEntry8] : None reported. [FreeTextEntry9] : Patient is practicing Rastafari and utilizes pray daily. [Physical Health] : Physical Health [Initial] : Initial [every ___ weeks] : every [unfilled] weeks [every ___ months] : every [unfilled] months [A change in level of care is needed as evidenced by:] : A change in level of care is needed as evidenced by: [Other rationale for transition/discharge:] : Other rationale for transition/discharge: [None - Reason others did not participate:] : None - Reason others did not participate:  [Yes] : Yes [Supervisor (if needed)] : Supervisor [Psychiatric Provider/Prescriber] : Psychiatric Provider/Prescriber [de-identified] : This is a prioritized, short-term goal.  [FreeTextEntry1] : Patient will maintain overall physical health and well being, and will address medical needs as necessary. [FreeTextEntry4] : "I want to take better care of myself and respect and love myself from now on." [de-identified] : Patient will attend all medical appointments as scheduled.  [de-identified] : 3/1/25 [de-identified] : Patient will exercise at least once a week to be more physically fit.  [FreeTextEntry5] : Therapist will use Motivational Interviewing and psychoeducation to assist patient in meeting their goals.  [de-identified] : 3/1/25 [de-identified] : Yolis Armstrong LMSW [de-identified] : Dr. Hein [de-identified] : Not clinically indicated. [de-identified] : When patient no longer requires individual psychotherapy and medication in order to perform at baseline, the patient will be discharged. [de-identified] : If patient is unable to perform activities of daily living and/or expresses suicidal ideation, a higher level of care will be considered.

## 2024-02-29 NOTE — PLAN
[Motivational Interviewing] : Motivational Interviewing  [Psychoeducation] : Psychoeducation  [Supportive Therapy] : Supportive Therapy [Recommended Frequency of Visits: ____] : Recommended frequency of visits: [unfilled] [Return in ____ week(s)] : Return in [unfilled] week(s) [FreeTextEntry2] : Treatment plan developed during session. Problem/Need I:   Domain for Problem/Need I: Mental Health.   Problem: Self Image/ PTSD.   Objective A: Patient will utilize positive self talk daily at least 50% of the time, and discuss events in life that lead to the negative self image.   Objective B: Patient will decrease SAMRA-7 by 1 point and report feeling more positive about self and less worried.    Problem/Need II: Domain for Problem/Need II: Physical Health. Problem: Patient will maintain overall physical health and well being, and will address medical needs as necessary. Objective A: Patient will attend all medical appointments as scheduled. Objective B: Patient will exercise at least once a week to be more physically fit. [de-identified] :  Session began at 2:25pm and ended at 3:15pm. Patient was seen via Solo audio/video session. Patient reported she is feeling proud of herself recently, noting the progress she has made. She notes she has been losing weight and working on herself. Patient identified her spirituality to be a big protective factor as she prays and speaks with God daily. She reports that she has begun a new relationship where she feels safe. Therapist commended patient on progress and encouraged patient to continue making gains.  Treatment plan developed during session. Patient was assessed; PHQ-9: 3, SAMRA-7: 3. [FreeTextEntry1] : Patient will continue to attend therapy to discuss symptoms and management of these symptoms. Patient will contact therapist if they are experiencing an increase in symptoms or difficulty in managing.

## 2024-02-29 NOTE — PLAN
[Psychoeducation] : Psychoeducation  [Motivational Interviewing] : Motivational Interviewing  [Supportive Therapy] : Supportive Therapy [Recommended Frequency of Visits: ____] : Recommended frequency of visits: [unfilled] [Return in ____ week(s)] : Return in [unfilled] week(s) [FreeTextEntry2] : Treatment plan developed during session. Problem/Need I:   Domain for Problem/Need I: Mental Health.   Problem: Self Image/ PTSD.   Objective A: Patient will utilize positive self talk daily at least 50% of the time, and discuss events in life that lead to the negative self image.   Objective B: Patient will decrease SAMRA-7 by 1 point and report feeling more positive about self and less worried.    Problem/Need II: Domain for Problem/Need II: Physical Health. Problem: Patient will maintain overall physical health and well being, and will address medical needs as necessary. Objective A: Patient will attend all medical appointments as scheduled. Objective B: Patient will exercise at least once a week to be more physically fit. [de-identified] :  Session began at 2:25pm and ended at 3:15pm. Patient was seen via Solo audio/video session. Patient reported she is feeling proud of herself recently, noting the progress she has made. She notes she has been losing weight and working on herself. Patient identified her spirituality to be a big protective factor as she prays and speaks with God daily. She reports that she has begun a new relationship where she feels safe. Therapist commended patient on progress and encouraged patient to continue making gains.  Treatment plan developed during session. Patient was assessed; PHQ-9: 3, SAMRA-7: 3. [FreeTextEntry1] : Patient will continue to attend therapy to discuss symptoms and management of these symptoms. Patient will contact therapist if they are experiencing an increase in symptoms or difficulty in managing.

## 2024-03-01 DIAGNOSIS — F25.0 SCHIZOAFFECTIVE DISORDER, BIPOLAR TYPE: ICD-10-CM

## 2024-03-01 DIAGNOSIS — F43.10 POST-TRAUMATIC STRESS DISORDER, UNSPECIFIED: ICD-10-CM

## 2024-03-08 ENCOUNTER — APPOINTMENT (OUTPATIENT)
Dept: PSYCHIATRY | Facility: CLINIC | Age: 49
End: 2024-03-08
Payer: COMMERCIAL

## 2024-03-08 ENCOUNTER — OUTPATIENT (OUTPATIENT)
Dept: OUTPATIENT SERVICES | Facility: HOSPITAL | Age: 49
LOS: 1 days | End: 2024-03-08
Payer: COMMERCIAL

## 2024-03-08 DIAGNOSIS — F43.10 POST-TRAUMATIC STRESS DISORDER, UNSPECIFIED: ICD-10-CM

## 2024-03-08 DIAGNOSIS — F25.0 SCHIZOAFFECTIVE DISORDER, BIPOLAR TYPE: ICD-10-CM

## 2024-03-08 PROCEDURE — 99214 OFFICE O/P EST MOD 30 MIN: CPT | Mod: 95
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
## 2024-03-08 NOTE — DISCUSSION/SUMMARY
[Date of Last Physical Exam: _____] : Date of Last Physical Exam: [unfilled] [Date of Last Annual Labs: _____] : Date of Last Annual Labs: [unfilled] [Annual Review of Systems Completed?] : Annual Review of Systems Completed: Yes [Tobacco Screening Completed?] : Tobacco Screening Completed: Yes [Date of Last AIMS: _____] : Date of Last AIMS: [unfilled] [Date of Last HbgA1c: _____] : Date of Last HbgA1c: [unfilled] [Date of Last Lipid Profile: _____] : Date of Last Lipid Profile: [unfilled] [FreeTextEntry1] : Nunu is a 47yo  female, history of Schizoaffective dx, PTSD.  Her mood, psychotic symptoms are under control with current regimen. She is at low risk, no acute risk of harm, adherent with medication regimen, positive family support, future oriented and linked to treatment.   Trilafon 4mg am, 8mg at 7pm + 11pm Benztropine 1mg po at 7pm Celexa 20mg po daily Trazodone 50mg po bedtime  Zepbound injection, weekly (wt loss) Synthyroid 50mcg po daily Omeprazole 20mg po daily Flucitosone spray

## 2024-03-08 NOTE — REASON FOR VISIT
[Patient] : Patient [Home] : at home, [unfilled] , at the time of the visit. [Other Location: e.g. Home (Enter Location, City,State)___] : at [unfilled] [Verbal consent obtained from patient] : the patient, [unfilled] [FreeTextEntry1] : Follow up for mood, psychotic symptoms

## 2024-03-08 NOTE — PHYSICAL EXAM
[None] : none [Average] : average [Cooperative] : cooperative [Euthymic] : euthymic [Full] : full [Clear] : clear [Linear/Goal Directed] : linear/goal directed [Hallucinations - Auditory] : hallucinations - auditory [WNL] : within normal limits [de-identified] : intermittent voice calling her name [0] : 0 [No] : No [FreeTextEntry1] : 0

## 2024-03-09 DIAGNOSIS — F25.0 SCHIZOAFFECTIVE DISORDER, BIPOLAR TYPE: ICD-10-CM

## 2024-03-09 DIAGNOSIS — F43.10 POST-TRAUMATIC STRESS DISORDER, UNSPECIFIED: ICD-10-CM

## 2024-03-21 ENCOUNTER — OUTPATIENT (OUTPATIENT)
Dept: OUTPATIENT SERVICES | Facility: HOSPITAL | Age: 49
LOS: 1 days | End: 2024-03-21
Payer: COMMERCIAL

## 2024-03-21 ENCOUNTER — APPOINTMENT (OUTPATIENT)
Dept: PSYCHIATRY | Facility: CLINIC | Age: 49
End: 2024-03-21

## 2024-03-21 DIAGNOSIS — F25.0 SCHIZOAFFECTIVE DISORDER, BIPOLAR TYPE: ICD-10-CM

## 2024-03-21 DIAGNOSIS — F43.10 POST-TRAUMATIC STRESS DISORDER, UNSPECIFIED: ICD-10-CM

## 2024-03-21 PROCEDURE — 90832 PSYTX W PT 30 MINUTES: CPT

## 2024-03-21 NOTE — PLAN
[Dialectical Behavior Therapy] : Dialectical Behavior Therapy  [Psychoeducation] : Psychoeducation  [Motivational Interviewing] : Motivational Interviewing  [Supportive Therapy] : Supportive Therapy [Recommended Frequency of Visits: ____] : Recommended frequency of visits: [unfilled] [Return in ____ week(s)] : Return in [unfilled] week(s) [FreeTextEntry2] : Problem/Need I:   Domain for Problem/Need I: Mental Health.   Problem: Self Image/ PTSD.   Objective A: Patient will utilize positive self talk daily at least 50% of the time, and discuss events in life that lead to the negative self image.   Objective B: Patient will decrease SAMRA-7 by 1 point and report feeling more positive about self and less worried.    Problem/Need II: Domain for Problem/Need II: Physical Health. Problem: Patient will maintain overall physical health and well being, and will address medical needs as necessary. Objective A: Patient will attend all medical appointments as scheduled. Objective B: Patient will exercise at least once a week to be more physically fit. [de-identified] : Session began at 2:30pm and ended at 3:00pm. Patient was seen via Solo audio/video session. Patient explored her emotions surrounding a recent discovery about her conception, her current role within her family and how it contributes to her self-image. Patient stated she will be starting to care for herself more to change the role she has and improve her self-esteem. She also notes an opportunity to participate in a creative class and is looking forward to creating art. [FreeTextEntry1] : Patient will continue to attend therapy to discuss symptoms and management of these symptoms. Patient will contact therapist if they are experiencing an increase in symptoms or difficulty in managing. Patient is scheduled for virtual session on 4/4/24 at 2:30pm.

## 2024-03-21 NOTE — REASON FOR VISIT
[Starting, patient seen in-person within last 6 months] : Telehealth services are being started as patient has seen in-person within last 6 months. [Patient preference] : as per patient preference [Telehealth (audio & video) - Individual/Group] : This visit was provided via telehealth using real-time 2-way audio visual technology. [Medical Office: ([facility])___] : The provider was located at the medical office in [unfilled]. [Verbal consent obtained from patient/other participant(s)] : Verbal consent for telehealth/telephonic services obtained from patient/other participant(s) [Home] : The patient, [unfilled], was located at home, [unfilled], at the time of the visit. [Patient] : Patient [FreeTextEntry5] : 3:00pm [FreeTextEntry4] : 2:30pm [FreeTextEntry1] : Psychotherapy follow up. [FreeTextEntry2] : 1/31/24

## 2024-03-21 NOTE — END OF VISIT
[Duration of Psychotherapy Visit (minutes spent in synchronous communication): ____] : Duration of Psychotherapy Visit (minutes spent in synchronous communication): [unfilled] [Individual Psychotherapy for 16-37 minutes] : Individual Psychotherapy for 16-37 minutes [Teletherapy Service Provided] : The services provided in this session were delivered via tele-therapy [FreeTextEntry5] : [address], [place] [address] [FreeTextEntry3] : [address] [facility], [place] [address]

## 2024-03-22 DIAGNOSIS — F43.10 POST-TRAUMATIC STRESS DISORDER, UNSPECIFIED: ICD-10-CM

## 2024-03-22 DIAGNOSIS — F25.0 SCHIZOAFFECTIVE DISORDER, BIPOLAR TYPE: ICD-10-CM

## 2024-04-04 ENCOUNTER — OUTPATIENT (OUTPATIENT)
Dept: OUTPATIENT SERVICES | Facility: HOSPITAL | Age: 49
LOS: 1 days | End: 2024-04-04
Payer: COMMERCIAL

## 2024-04-04 ENCOUNTER — APPOINTMENT (OUTPATIENT)
Dept: PSYCHIATRY | Facility: CLINIC | Age: 49
End: 2024-04-04

## 2024-04-04 DIAGNOSIS — F43.10 POST-TRAUMATIC STRESS DISORDER, UNSPECIFIED: ICD-10-CM

## 2024-04-04 DIAGNOSIS — F25.0 SCHIZOAFFECTIVE DISORDER, BIPOLAR TYPE: ICD-10-CM

## 2024-04-04 PROCEDURE — 90832 PSYTX W PT 30 MINUTES: CPT

## 2024-04-04 NOTE — PLAN
[FreeTextEntry2] : Problem/Need I:   Domain for Problem/Need I: Mental Health.   Problem: Self Image/ PTSD.   Objective A: Patient will utilize positive self talk daily at least 50% of the time, and discuss events in life that lead to the negative self image.   Objective B: Patient will decrease SAMRA-7 by 1 point and report feeling more positive about self and less worried.    Problem/Need II: Domain for Problem/Need II: Physical Health. Problem: Patient will maintain overall physical health and well being, and will address medical needs as necessary. Objective A: Patient will attend all medical appointments as scheduled. Objective B: Patient will exercise at least once a week to be more physically fit. [Motivational Interviewing] : Motivational Interviewing  [Psychodynamic Therapy] : Psychodynamic Therapy  [Psychoeducation] : Psychoeducation  [Supportive Therapy] : Supportive Therapy [de-identified] : Session began at 2:27pm and ended at 2:58pm. Patient was seen via Solo audio/video session. Patient reported that she is feeling improved and is "in a good place and believe in myself". 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 Therapist commended patient on acknowledging positive aspects of herself and encourages her to continue to utilize positive self-talk.  [Recommended Frequency of Visits: ____] : Recommended frequency of visits: [unfilled] [Return in ____ week(s)] : Return in [unfilled] week(s) [FreeTextEntry1] : Patient will continue to attend therapy to discuss symptoms and management of these symptoms. Patient will contact therapist if they are experiencing an increase in symptoms or difficulty in managing. Patient is scheduled for virtual session on 4/18/24 at 2:30pm.

## 2024-04-04 NOTE — END OF VISIT
[Duration of Psychotherapy Visit (minutes spent in synchronous communication): ____] : Duration of Psychotherapy Visit (minutes spent in synchronous communication): [unfilled] [Individual Psychotherapy for 16-37 minutes] : Individual Psychotherapy for 16-37 minutes [Teletherapy Service Provided] : The services provided in this session were delivered via tele-therapy [FreeTextEntry3] : [address] [facility], [place] [address] [FreeTextEntry5] : [address], [place] [address]

## 2024-04-04 NOTE — REASON FOR VISIT
[Patient preference] : as per patient preference [Continuing, patient seen in-person within last 12 months] : Telehealth services are continuing as patient has been seen in-person within last 12 months. [Telehealth (audio & video) - Individual/Group] : This visit was provided via telehealth using real-time 2-way audio visual technology. [Medical Office: ([facility])___] : The provider was located at the medical office in [unfilled]. [Home] : The patient, [unfilled], was located at home, [unfilled], at the time of the visit. [Verbal consent obtained from patient/other participant(s)] : Verbal consent for telehealth/telephonic services obtained from patient/other participant(s) [FreeTextEntry4] : 2:27pm [FreeTextEntry5] : 2:58pm [FreeTextEntry2] : 1/31/24 [Patient] : Patient [FreeTextEntry1] : Psychotherapy follow up.

## 2024-04-05 DIAGNOSIS — F43.10 POST-TRAUMATIC STRESS DISORDER, UNSPECIFIED: ICD-10-CM

## 2024-04-05 DIAGNOSIS — F25.0 SCHIZOAFFECTIVE DISORDER, BIPOLAR TYPE: ICD-10-CM

## 2024-04-18 ENCOUNTER — APPOINTMENT (OUTPATIENT)
Dept: PSYCHIATRY | Facility: CLINIC | Age: 49
End: 2024-04-18

## 2024-04-18 ENCOUNTER — OUTPATIENT (OUTPATIENT)
Dept: OUTPATIENT SERVICES | Facility: HOSPITAL | Age: 49
LOS: 1 days | End: 2024-04-18
Payer: COMMERCIAL

## 2024-04-18 DIAGNOSIS — F43.10 POST-TRAUMATIC STRESS DISORDER, UNSPECIFIED: ICD-10-CM

## 2024-04-18 DIAGNOSIS — F25.0 SCHIZOAFFECTIVE DISORDER, BIPOLAR TYPE: ICD-10-CM

## 2024-04-18 PROCEDURE — 90832 PSYTX W PT 30 MINUTES: CPT
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
## 2024-04-18 NOTE — REASON FOR VISIT
[Patient preference] : as per patient preference [Continuing, patient seen in-person within last 12 months] : Telehealth services are continuing as patient has been seen in-person within last 12 months. [Telehealth (audio & video) - Individual/Group] : This visit was provided via telehealth using real-time 2-way audio visual technology. [Medical Office: ([facility])___] : The provider was located at the medical office in [unfilled]. [Home] : The patient, [unfilled], was located at home, [unfilled], at the time of the visit. [Verbal consent obtained from patient/other participant(s)] : Verbal consent for telehealth/telephonic services obtained from patient/other participant(s) [Patient] : Patient [FreeTextEntry4] : 2:30pm [FreeTextEntry5] : 3:00pm [FreeTextEntry2] : 1/31/24 [FreeTextEntry1] : Psychotherapy follow up.

## 2024-04-18 NOTE — END OF VISIT
[Duration of Psychotherapy Visit (minutes spent in synchronous communication): ____] : Duration of Psychotherapy Visit (minutes spent in synchronous communication): [unfilled] [Individual Psychotherapy for 16-37 minutes] : Individual Psychotherapy for 16-37 minutes [Teletherapy Service Provided] : The services provided in this session were delivered via tele-therapy [FreeTextEntry3] : [address] [facility], [place] [address] [FreeTextEntry5] : [address], [place] [address]

## 2024-04-19 DIAGNOSIS — F25.0 SCHIZOAFFECTIVE DISORDER, BIPOLAR TYPE: ICD-10-CM

## 2024-04-19 DIAGNOSIS — F43.10 POST-TRAUMATIC STRESS DISORDER, UNSPECIFIED: ICD-10-CM

## 2024-04-29 RX ORDER — PERPHENAZINE 4 MG/1
4 TABLET ORAL
Qty: 30 | Refills: 2 | Status: ACTIVE | COMMUNITY
Start: 2021-05-05 | End: 1900-01-01

## 2024-04-29 RX ORDER — BENZTROPINE MESYLATE 1 MG/1
1 TABLET ORAL
Qty: 30 | Refills: 2 | Status: ACTIVE | COMMUNITY
Start: 2021-05-05 | End: 1900-01-01

## 2024-04-29 RX ORDER — TRAZODONE HYDROCHLORIDE 50 MG/1
50 TABLET ORAL
Qty: 30 | Refills: 2 | Status: ACTIVE | COMMUNITY
Start: 1900-01-01 | End: 1900-01-01

## 2024-04-29 RX ORDER — PERPHENAZINE 8 MG/1
8 TABLET ORAL
Qty: 60 | Refills: 2 | Status: ACTIVE | COMMUNITY
Start: 2021-05-05 | End: 1900-01-01

## 2024-04-29 RX ORDER — CITALOPRAM HYDROBROMIDE 20 MG/1
20 TABLET, FILM COATED ORAL
Qty: 30 | Refills: 2 | Status: ACTIVE | COMMUNITY
Start: 1900-01-01 | End: 1900-01-01

## 2024-05-02 ENCOUNTER — APPOINTMENT (OUTPATIENT)
Dept: PSYCHIATRY | Facility: CLINIC | Age: 49
End: 2024-05-02

## 2024-05-02 ENCOUNTER — OUTPATIENT (OUTPATIENT)
Dept: OUTPATIENT SERVICES | Facility: HOSPITAL | Age: 49
LOS: 1 days | End: 2024-05-02
Payer: COMMERCIAL

## 2024-05-02 DIAGNOSIS — F25.0 SCHIZOAFFECTIVE DISORDER, BIPOLAR TYPE: ICD-10-CM

## 2024-05-02 DIAGNOSIS — F43.10 POST-TRAUMATIC STRESS DISORDER, UNSPECIFIED: ICD-10-CM

## 2024-05-02 PROCEDURE — 90832 PSYTX W PT 30 MINUTES: CPT

## 2024-05-02 NOTE — END OF VISIT
[Duration of Psychotherapy Visit (minutes spent in synchronous communication): ____] : Duration of Psychotherapy Visit (minutes spent in synchronous communication): [unfilled] [Individual Psychotherapy for 16-37 minutes] : Individual Psychotherapy for 16-37 minutes [Teletherapy Service Provided] : The services provided in this session were delivered via tele-therapy [FreeTextEntry3] : [address] [facility], [place] [address] [FreeTextEntry5] : [address], [place] [address]

## 2024-05-02 NOTE — PLAN
[Dialectical Behavior Therapy] : Dialectical Behavior Therapy  [Motivational Interviewing] : Motivational Interviewing  [Psychodynamic Therapy] : Psychodynamic Therapy  [Psychoeducation] : Psychoeducation  [Supportive Therapy] : Supportive Therapy [Recommended Frequency of Visits: ____] : Recommended frequency of visits: [unfilled] [Return in ____ week(s)] : Return in [unfilled] week(s) [FreeTextEntry2] : Problem/Need I:   Domain for Problem/Need I: Mental Health.   Problem: Self Image/ PTSD.   Objective A: Patient will utilize positive self talk daily at least 50% of the time, and discuss events in life that lead to the negative self image.   Objective B: Patient will decrease SAMRA-7 by 1 point and report feeling more positive about self and less worried.    Problem/Need II: Domain for Problem/Need II: Physical Health. Problem: Patient will maintain overall physical health and well being, and will address medical needs as necessary. Objective A: Patient will attend all medical appointments as scheduled. Objective B: Patient will exercise at least once a week to be more physically fit. [de-identified] : Session began at 2:56pm and ended at 3:31pm. Patient was seen via Solo audio/video session. 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 Patient expressed her frustration with this scenario and another pertaining to romantic relationship. Therapist and patient explored these emotions and how this has impacted her mental health, and identified ways in which she can be assertive to have self-direction. Patient continues to rely heavily on her L[employer] day and spirituality to provide her strength.  [FreeTextEntry1] : Patient will continue to attend therapy to discuss symptoms and management of these symptoms. Patient will contact therapist if they are experiencing an increase in symptoms or difficulty in managing. Patient is scheduled for virtual session on 5/16/24 at .

## 2024-05-02 NOTE — REASON FOR VISIT
[Patient preference] : as per patient preference [Continuing, patient seen in-person within last 12 months] : Telehealth services are continuing as patient has been seen in-person within last 12 months. [Telehealth (audio & video) - Individual/Group] : This visit was provided via telehealth using real-time 2-way audio visual technology. [Medical Office: ([facility])___] : The provider was located at the medical office in [unfilled]. [Home] : The patient, [unfilled], was located at home, [unfilled], at the time of the visit. [Verbal consent obtained from patient/other participant(s)] : Verbal consent for telehealth/telephonic services obtained from patient/other participant(s) [Patient] : Patient [FreeTextEntry4] : 2:56pm [FreeTextEntry5] : 3:31PM [FreeTextEntry2] : 1/31/24 [FreeTextEntry1] : Psychotherapy follow up.

## 2024-05-03 DIAGNOSIS — F25.0 SCHIZOAFFECTIVE DISORDER, BIPOLAR TYPE: ICD-10-CM

## 2024-05-03 DIAGNOSIS — F43.10 POST-TRAUMATIC STRESS DISORDER, UNSPECIFIED: ICD-10-CM

## 2024-05-10 ENCOUNTER — OUTPATIENT (OUTPATIENT)
Dept: OUTPATIENT SERVICES | Facility: HOSPITAL | Age: 49
LOS: 1 days | End: 2024-05-10
Payer: COMMERCIAL

## 2024-05-10 ENCOUNTER — APPOINTMENT (OUTPATIENT)
Dept: PSYCHIATRY | Facility: CLINIC | Age: 49
End: 2024-05-10
Payer: COMMERCIAL

## 2024-05-10 DIAGNOSIS — F25.0 SCHIZOAFFECTIVE DISORDER, BIPOLAR TYPE: ICD-10-CM

## 2024-05-10 DIAGNOSIS — F43.10 POST-TRAUMATIC STRESS DISORDER, UNSPECIFIED: ICD-10-CM

## 2024-05-10 PROCEDURE — 99214 OFFICE O/P EST MOD 30 MIN: CPT | Mod: 95

## 2024-05-10 NOTE — DISCUSSION/SUMMARY
[Date of Last Physical Exam: _____] : Date of Last Physical Exam: [unfilled] [Date of Last Annual Labs: _____] : Date of Last Annual Labs: [unfilled] [Annual Review of Systems Completed?] : Annual Review of Systems Completed: Yes [Tobacco Screening Completed?] : Tobacco Screening Completed: Yes [Date of Last AIMS: _____] : Date of Last AIMS: [unfilled] [Date of Last HbgA1c: _____] : Date of Last HbgA1c: [unfilled] [Date of Last Lipid Profile: _____] : Date of Last Lipid Profile: [unfilled] [FreeTextEntry1] : Nunu is a 47yo  female, history of Schizoaffective dx, PTSD.  Her mood, psychotic symptoms are under control with current regimen.   Trilafon 4mg am, 8mg at 7pm + 11pm Benztropine 1mg po at 7pm Celexa 20mg po daily Trazodone 50mg po bedtime  Zepbound injection, weekly (wt loss) Synthyroid 50mcg po daily Omeprazole 20mg po daily Flucitosone spray

## 2024-05-10 NOTE — PHYSICAL EXAM
[None] : none [Average] : average [Cooperative] : cooperative [Euthymic] : euthymic [Full] : full [Clear] : clear [Linear/Goal Directed] : linear/goal directed [Hallucinations - Auditory] : hallucinations - auditory [WNL] : within normal limits [de-identified] : intermittent voice calling her name
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
## 2024-05-11 DIAGNOSIS — F43.10 POST-TRAUMATIC STRESS DISORDER, UNSPECIFIED: ICD-10-CM

## 2024-05-11 DIAGNOSIS — F25.0 SCHIZOAFFECTIVE DISORDER, BIPOLAR TYPE: ICD-10-CM

## 2024-05-16 ENCOUNTER — OUTPATIENT (OUTPATIENT)
Dept: OUTPATIENT SERVICES | Facility: HOSPITAL | Age: 49
LOS: 1 days | End: 2024-05-16
Payer: COMMERCIAL

## 2024-05-16 ENCOUNTER — APPOINTMENT (OUTPATIENT)
Dept: PSYCHIATRY | Facility: CLINIC | Age: 49
End: 2024-05-16

## 2024-05-16 DIAGNOSIS — F25.0 SCHIZOAFFECTIVE DISORDER, BIPOLAR TYPE: ICD-10-CM

## 2024-05-16 DIAGNOSIS — F43.10 POST-TRAUMATIC STRESS DISORDER, UNSPECIFIED: ICD-10-CM

## 2024-05-16 PROCEDURE — 90834 PSYTX W PT 45 MINUTES: CPT

## 2024-05-16 NOTE — PLAN
[FreeTextEntry2] : Problem/Need I:   Domain for Problem/Need I: Mental Health.   Problem: Self Image/ PTSD.   Objective A: Patient will utilize positive self talk daily at least 50% of the time, and discuss events in life that lead to the negative self image.   Objective B: Patient will decrease SAMRA-7 by 1 point and report feeling more positive about self and less worried.    Problem/Need II: Domain for Problem/Need II: Physical Health. Problem: Patient will maintain overall physical health and well being, and will address medical needs as necessary. Objective A: Patient will attend all medical appointments as scheduled. Objective B: Patient will exercise at least once a week to be more physically fit. [Motivational Interviewing] : Motivational Interviewing  [Psychoeducation] : Psychoeducation  [Supportive Therapy] : Supportive Therapy [de-identified] : Session began at 3:13pm and ended at 3:53pm. Patient was seen via Solo audio/video session. Patient stated she has decided to pursue a relationship with someone of interest and is aware the situation will not be received well by her family. Therapist and patient explored ways in which to be safe during this time. Patient stated she is proud of herself for pursuing something of interest to her without hearing the negativity from her family.  [Recommended Frequency of Visits: ____] : Recommended frequency of visits: [unfilled] [Return in ____ week(s)] : Return in [unfilled] week(s) [FreeTextEntry1] : Patient will continue to attend therapy to discuss symptoms and management of these symptoms. Patient will contact therapist if they are experiencing an increase in symptoms or difficulty in managing. Patient is scheduled for virtual session on 5/30/24 at 2:30p.

## 2024-05-16 NOTE — REASON FOR VISIT
[Patient preference] : as per patient preference [Continuing, patient seen in-person within last 12 months] : Telehealth services are continuing as patient has been seen in-person within last 12 months. [Telehealth (audio & video) - Individual/Group] : This visit was provided via telehealth using real-time 2-way audio visual technology. [Other Location: e.g. Home (Enter Location, City,State)___] : The provider was located at [unfilled]. [Home] : The patient, [unfilled], was located at home, [unfilled], at the time of the visit. [Verbal consent obtained from patient/other participant(s)] : Verbal consent for telehealth/telephonic services obtained from patient/other participant(s) [FreeTextEntry4] : 3:13pm [FreeTextEntry5] : 3:53pm [FreeTextEntry2] : 1/31/24 [Patient] : Patient [FreeTextEntry1] : Psychotherapy follow up.

## 2024-05-16 NOTE — END OF VISIT
[Duration of Psychotherapy Visit (minutes spent in synchronous communication): ____] : Duration of Psychotherapy Visit (minutes spent in synchronous communication): [unfilled] [Individual Psychotherapy for 38-52 minutes] : Individual Psychotherapy for 38 - 52 minutes [Teletherapy Service Provided] : The services provided in this session were delivered via tele-therapy [FreeTextEntry3] : [address] [facility] [place], [place] [address] [FreeTextEntry5] : Remote telehealth hub

## 2024-05-17 DIAGNOSIS — F43.10 POST-TRAUMATIC STRESS DISORDER, UNSPECIFIED: ICD-10-CM

## 2024-05-17 DIAGNOSIS — F25.0 SCHIZOAFFECTIVE DISORDER, BIPOLAR TYPE: ICD-10-CM

## 2024-05-30 ENCOUNTER — APPOINTMENT (OUTPATIENT)
Dept: PSYCHIATRY | Facility: CLINIC | Age: 49
End: 2024-05-30

## 2024-05-30 ENCOUNTER — OUTPATIENT (OUTPATIENT)
Dept: OUTPATIENT SERVICES | Facility: HOSPITAL | Age: 49
LOS: 1 days | End: 2024-05-30
Payer: COMMERCIAL

## 2024-05-30 DIAGNOSIS — F43.10 POST-TRAUMATIC STRESS DISORDER, UNSPECIFIED: ICD-10-CM

## 2024-05-30 DIAGNOSIS — F25.0 SCHIZOAFFECTIVE DISORDER, BIPOLAR TYPE: ICD-10-CM

## 2024-05-30 PROCEDURE — 90832 PSYTX W PT 30 MINUTES: CPT

## 2024-05-30 NOTE — PLAN
[Motivational Interviewing] : Motivational Interviewing  [Supportive Therapy] : Supportive Therapy [Recommended Frequency of Visits: ____] : Recommended frequency of visits: [unfilled] [Return in ____ week(s)] : Return in [unfilled] week(s) [FreeTextEntry2] : Problem/Need I:   Domain for Problem/Need I: Mental Health.   Problem: Self Image/ PTSD.   Objective A: Patient will utilize positive self talk daily at least 50% of the time, and discuss events in life that lead to the negative self image.   Objective B: Patient will decrease SAMRA-7 by 1 point and report feeling more positive about self and less worried.    Problem/Need II: Domain for Problem/Need II: Physical Health. Problem: Patient will maintain overall physical health and well being, and will address medical needs as necessary. Objective A: Patient will attend all medical appointments as scheduled. Objective B: Patient will exercise at least once a week to be more physically fit. [de-identified] : Session began at 2:22pm and ended at 2:57pm. Patient was seen via Solo audio/video session. Patient stated she has been happy at work and continues to engage in 'entrepreneurial' activities. She reported her relationship has changed in some ways, he did not arrive to [place] and she is not being overly hopeful about the situation. Patient stated she is going to continue to pursue activities that contribute to her happiness and doesn't wish to rely on a relationship to be fulfilled as previously instilled in her; she is wanting to have a side job to ensure she can travel the world. [FreeTextEntry1] : Patient will continue to attend therapy to discuss symptoms and management of these symptoms. Patient will contact therapist if they are experiencing an increase in symptoms or difficulty in managing. Patient is scheduled for virtual session on 6/13 at 3:00p.

## 2024-05-30 NOTE — REASON FOR VISIT
[Patient preference] : as per patient preference [Continuing, patient seen in-person within last 12 months] : Telehealth services are continuing as patient has been seen in-person within last 12 months. [Telehealth (audio & video) - Individual/Group] : This visit was provided via telehealth using real-time 2-way audio visual technology. [Other Location: e.g. Home (Enter Location, City,State)___] : The provider was located at [unfilled]. [Home] : The patient, [unfilled], was located at home, [unfilled], at the time of the visit. [Verbal consent obtained from patient/other participant(s)] : Verbal consent for telehealth/telephonic services obtained from patient/other participant(s) [Patient] : Patient [FreeTextEntry4] : 2:22pm [FreeTextEntry5] : 2:57pm [FreeTextEntry2] : 1/31/24 [FreeTextEntry1] : Psychotherapy follow up.

## 2024-05-30 NOTE — END OF VISIT
[Individual Psychotherapy for 16-37 minutes] : Individual Psychotherapy for 16-37 minutes [Teletherapy Service Provided] : The services provided in this session were delivered via tele-therapy [Duration of Psychotherapy Visit (minutes spent in synchronous communication): ____] : Duration of Psychotherapy Visit (minutes spent in synchronous communication): [unfilled] [FreeTextEntry3] : [address] [facility], [place] [address] [FreeTextEntry5] : Remote telehealth hub

## 2024-05-31 DIAGNOSIS — F25.0 SCHIZOAFFECTIVE DISORDER, BIPOLAR TYPE: ICD-10-CM

## 2024-05-31 DIAGNOSIS — F43.10 POST-TRAUMATIC STRESS DISORDER, UNSPECIFIED: ICD-10-CM

## 2024-06-13 ENCOUNTER — APPOINTMENT (OUTPATIENT)
Dept: PSYCHIATRY | Facility: CLINIC | Age: 49
End: 2024-06-13

## 2024-06-13 ENCOUNTER — OUTPATIENT (OUTPATIENT)
Dept: OUTPATIENT SERVICES | Facility: HOSPITAL | Age: 49
LOS: 1 days | End: 2024-06-13
Payer: COMMERCIAL

## 2024-06-13 DIAGNOSIS — F43.10 POST-TRAUMATIC STRESS DISORDER, UNSPECIFIED: ICD-10-CM

## 2024-06-13 DIAGNOSIS — F25.0 SCHIZOAFFECTIVE DISORDER, BIPOLAR TYPE: ICD-10-CM

## 2024-06-13 PROCEDURE — 90832 PSYTX W PT 30 MINUTES: CPT

## 2024-06-13 NOTE — END OF VISIT
[Individual Psychotherapy for 16-37 minutes] : Individual Psychotherapy for 16-37 minutes [Teletherapy Service Provided] : The services provided in this session were delivered via tele-therapy [Duration of Psychotherapy Visit (minutes spent in synchronous communication): ____] : Duration of Psychotherapy Visit (minutes spent in synchronous communication): [unfilled] [FreeTextEntry3] : [address] [facility], [place] [address] [FreeTextEntry5] : Remote telehealth hub

## 2024-06-13 NOTE — REASON FOR VISIT
[Patient preference] : as per patient preference [Continuing, patient seen in-person within last 12 months] : Telehealth services are continuing as patient has been seen in-person within last 12 months. [Telehealth (audio & video) - Individual/Group] : This visit was provided via telehealth using real-time 2-way audio visual technology. [Other Location: e.g. Home (Enter Location, City,State)___] : The provider was located at [unfilled]. [Home] : The patient, [unfilled], was located at home, [unfilled], at the time of the visit. [Verbal consent obtained from patient/other participant(s)] : Verbal consent for telehealth/telephonic services obtained from patient/other participant(s) [Patient] : Patient [FreeTextEntry4] : 3:19pm [FreeTextEntry5] : 3:50pm [FreeTextEntry2] : 1/31/24 [FreeTextEntry1] : Psychotherapy follow up.

## 2024-06-13 NOTE — PLAN
[Motivational Interviewing] : Motivational Interviewing  [Psychoeducation] : Psychoeducation  [Supportive Therapy] : Supportive Therapy [Recommended Frequency of Visits: ____] : Recommended frequency of visits: [unfilled] [Return in ____ week(s)] : Return in [unfilled] week(s) [FreeTextEntry2] : Problem/Need I:   Domain for Problem/Need I: Mental Health.   Problem: Self Image/ PTSD.   Objective A: Patient will utilize positive self talk daily at least 50% of the time, and discuss events in life that lead to the negative self image.   Objective B: Patient will decrease SAMRA-7 by 1 point and report feeling more positive about self and less worried.    Problem/Need II: Domain for Problem/Need II: Physical Health. Problem: Patient will maintain overall physical health and well being, and will address medical needs as necessary. Objective A: Patient will attend all medical appointments as scheduled. Objective B: Patient will exercise at least once a week to be more physically fit. [de-identified] : Session began at 3:19pm and ended at 3:50pm. Patient was seen via Solo audio/video session. Patient expressed she feels she has made a transformation since last year, noting her depression has decreased. She was tearful throughout session noting she believes she is 'stuck in this role' within her family and has to always be available to help family members, leaving little time for herself.  She asked about financial spending as it pertains to her diagnosis; therapist provided psychoeducation. She stated she has challenged paying for accumulated medical bills, student loans and wants to pursue other programs. Therapist explored with patient the ability to make a budget for herself and utilize a worksheet to help her pay her bills in a timely and affordable way.  [FreeTextEntry1] : Patient will continue to attend therapy to discuss symptoms and management of these symptoms. Patient will contact therapist if they are experiencing an increase in symptoms or difficulty in managing. Patient is scheduled for virtual session on 6/27/24 at 3:00pm.

## 2024-06-14 DIAGNOSIS — F43.10 POST-TRAUMATIC STRESS DISORDER, UNSPECIFIED: ICD-10-CM

## 2024-06-14 DIAGNOSIS — F25.0 SCHIZOAFFECTIVE DISORDER, BIPOLAR TYPE: ICD-10-CM

## 2024-06-27 ENCOUNTER — NON-APPOINTMENT (OUTPATIENT)
Age: 49
End: 2024-06-27

## 2024-06-27 ENCOUNTER — APPOINTMENT (OUTPATIENT)
Dept: PSYCHIATRY | Facility: CLINIC | Age: 49
End: 2024-06-27

## 2024-07-03 ENCOUNTER — APPOINTMENT (OUTPATIENT)
Dept: PSYCHIATRY | Facility: CLINIC | Age: 49
End: 2024-07-03

## 2024-07-03 ENCOUNTER — OUTPATIENT (OUTPATIENT)
Dept: OUTPATIENT SERVICES | Facility: HOSPITAL | Age: 49
LOS: 1 days | End: 2024-07-03
Payer: COMMERCIAL

## 2024-07-03 DIAGNOSIS — F43.10 POST-TRAUMATIC STRESS DISORDER, UNSPECIFIED: ICD-10-CM

## 2024-07-03 DIAGNOSIS — F25.0 SCHIZOAFFECTIVE DISORDER, BIPOLAR TYPE: ICD-10-CM

## 2024-07-03 PROCEDURE — 90832 PSYTX W PT 30 MINUTES: CPT

## 2024-07-04 DIAGNOSIS — F43.10 POST-TRAUMATIC STRESS DISORDER, UNSPECIFIED: ICD-10-CM

## 2024-07-04 DIAGNOSIS — F25.0 SCHIZOAFFECTIVE DISORDER, BIPOLAR TYPE: ICD-10-CM

## 2024-07-17 ENCOUNTER — OUTPATIENT (OUTPATIENT)
Dept: OUTPATIENT SERVICES | Facility: HOSPITAL | Age: 49
LOS: 1 days | End: 2024-07-17
Payer: COMMERCIAL

## 2024-07-17 ENCOUNTER — APPOINTMENT (OUTPATIENT)
Dept: PSYCHIATRY | Facility: CLINIC | Age: 49
End: 2024-07-17
Payer: COMMERCIAL

## 2024-07-17 VITALS — BODY MASS INDEX: 48.82 KG/M2 | WEIGHT: 293 LBS | HEIGHT: 65 IN

## 2024-07-17 DIAGNOSIS — F43.10 POST-TRAUMATIC STRESS DISORDER, UNSPECIFIED: ICD-10-CM

## 2024-07-17 DIAGNOSIS — F25.0 SCHIZOAFFECTIVE DISORDER, BIPOLAR TYPE: ICD-10-CM

## 2024-07-17 PROCEDURE — 99214 OFFICE O/P EST MOD 30 MIN: CPT

## 2024-07-18 DIAGNOSIS — F25.0 SCHIZOAFFECTIVE DISORDER, BIPOLAR TYPE: ICD-10-CM

## 2024-07-18 DIAGNOSIS — F43.10 POST-TRAUMATIC STRESS DISORDER, UNSPECIFIED: ICD-10-CM

## 2024-07-24 ENCOUNTER — APPOINTMENT (OUTPATIENT)
Dept: PSYCHIATRY | Facility: CLINIC | Age: 49
End: 2024-07-24

## 2024-07-24 NOTE — END OF VISIT
[Duration of Psychotherapy Visit (minutes spent in synchronous communication): ____] : Duration of Psychotherapy Visit (minutes spent in synchronous communication): [unfilled] [Individual Psychotherapy for 16-37 minutes] : Individual Psychotherapy for 16-37 minutes [Teletherapy Service Provided] : The services provided in this session were delivered via tele-therapy [FreeTextEntry3] : [address] [facility], [place] [address] [FreeTextEntry5] : Remote telehealth hub

## 2024-07-24 NOTE — PLAN
[Motivational Interviewing] : Motivational Interviewing  [Psychodynamic Therapy] : Psychodynamic Therapy  [Supportive Therapy] : Supportive Therapy [Recommended Frequency of Visits: ____] : Recommended frequency of visits: [unfilled] [Return in ____ week(s)] : Return in [unfilled] week(s) [FreeTextEntry2] : Problem/Need I:   Domain for Problem/Need I: Mental Health.   Problem: Self Image/ PTSD.   Objective A: Patient will utilize positive self talk daily at least 50% of the time, and discuss events in life that lead to the negative self image.   Objective B: Patient will decrease SAMRA-7 by 1 point and report feeling more positive about self and less worried.    Problem/Need II: Domain for Problem/Need II: Physical Health. Problem: Patient will maintain overall physical health and well being, and will address medical needs as necessary. Objective A: Patient will attend all medical appointments as scheduled. Objective B: Patient will exercise at least once a week to be more physically fit. [de-identified] : Session began at 3:02pm and ended at 3:36pm, and they were seen via Solo audio/video session. She stated she is doing well at her job and expressed she has been thinking about her past experiences and how they relate to her thoughts today. She explored her feelings about her biological father not wanting to be on her birth certificate and how this led to her 'hating men' for some time, she has since 'made peace' with this. She noted this can be a correlation to her overeating to not deal with her emotions. Therapist and patient explored this as she expressed wanting to work towards happiness and an improved self-image. She continues to rely on her carlos for support and protection, and has begun sorting out her finances.  [FreeTextEntry1] : Patient will continue to attend therapy to discuss symptoms and management of these symptoms. Patient will contact therapist if they are experiencing an increase in symptoms or difficulty in managing. Next virtual session is scheduled for 8/8 at .

## 2024-07-24 NOTE — REASON FOR VISIT
[Patient preference] : as per patient preference [Continuing, patient seen in-person within last 12 months] : Telehealth services are continuing as patient has been seen in-person within last 12 months. [Telehealth (audio & video) - Individual/Group] : This visit was provided via telehealth using real-time 2-way audio visual technology. [Other Location: e.g. Home (Enter Location, City,State)___] : The provider was located at [unfilled]. [Home] : The patient, [unfilled], was located at home, [unfilled], at the time of the visit. [Verbal consent obtained from patient/other participant(s)] : Verbal consent for telehealth/telephonic services obtained from patient/other participant(s) [Patient] : Patient [FreeTextEntry4] : 3:02pm [FreeTextEntry5] : 3:36pm [FreeTextEntry2] : 1/31/24 [FreeTextEntry1] : Psychotherapy follow up

## 2024-07-24 NOTE — END OF VISIT
[Duration of Psychotherapy Visit (minutes spent in synchronous communication): ____] : Duration of Psychotherapy Visit (minutes spent in synchronous communication): [unfilled] [Individual Psychotherapy for 16-37 minutes] : Individual Psychotherapy for 16-37 minutes [Teletherapy Service Provided] : The services provided in this session were delivered via tele-therapy [FreeTextEntry3] : [address] [facility], [place] [address] [FreeTextEntry5] : Remote telehealth hub

## 2024-08-08 ENCOUNTER — OUTPATIENT (OUTPATIENT)
Dept: OUTPATIENT SERVICES | Facility: HOSPITAL | Age: 49
LOS: 1 days | End: 2024-08-08
Payer: COMMERCIAL

## 2024-08-08 ENCOUNTER — APPOINTMENT (OUTPATIENT)
Dept: PSYCHIATRY | Facility: CLINIC | Age: 49
End: 2024-08-08

## 2024-08-08 DIAGNOSIS — F43.10 POST-TRAUMATIC STRESS DISORDER, UNSPECIFIED: ICD-10-CM

## 2024-08-08 DIAGNOSIS — F25.0 SCHIZOAFFECTIVE DISORDER, BIPOLAR TYPE: ICD-10-CM

## 2024-08-08 PROCEDURE — 90832 PSYTX W PT 30 MINUTES: CPT

## 2024-08-08 NOTE — END OF VISIT
[Individual Psychotherapy for 16-37 minutes] : Individual Psychotherapy for 16-37 minutes [Teletherapy Service Provided] : The services provided in this session were delivered via tele-therapy [Duration of Psychotherapy Visit (minutes spent in synchronous communication): ____] : Duration of Psychotherapy Visit (minutes spent in synchronous communication): [unfilled] [FreeTextEntry3] : [address] [facility], [place] [address] [FreeTextEntry5] : Remote telehealth hub

## 2024-08-08 NOTE — REASON FOR VISIT
[Patient preference] : as per patient preference [Continuing, patient seen in-person within last 12 months] : Telehealth services are continuing as patient has been seen in-person within last 12 months. [Telehealth (audio & video) - Individual/Group] : This visit was provided via telehealth using real-time 2-way audio visual technology. [Other Location: e.g. Home (Enter Location, City,State)___] : The provider was located at [unfilled]. [Home] : The patient, [unfilled], was located at home, [unfilled], at the time of the visit. [Verbal consent obtained from patient/other participant(s)] : Verbal consent for telehealth/telephonic services obtained from patient/other participant(s) [Patient] : Patient [FreeTextEntry4] : 2:56pm [FreeTextEntry5] : 3:29pm [FreeTextEntry2] : 1/31/24 [FreeTextEntry1] : Psychotherapy follow up

## 2024-08-08 NOTE — PLAN
[Recommended Frequency of Visits: ____] : Recommended frequency of visits: [unfilled] [Return in ____ week(s)] : Return in [unfilled] week(s) [FreeTextEntry2] : Problem/Need I:   Domain for Problem/Need I: Mental Health.   Problem: Self Image/ PTSD.   Objective A: Patient will utilize positive self talk daily at least 50% of the time, and discuss events in life that lead to the negative self image.   Objective B: Patient will decrease SAMRA-7 by 1 point and report feeling more positive about self and less worried.    Problem/Need II: Domain for Problem/Need II: Physical Health. Problem: Patient will maintain overall physical health and well being, and will address medical needs as necessary. Objective A: Patient will attend all medical appointments as scheduled. Objective B: Patient will exercise at least once a week to be more physically fit. [Dialectical Behavior Therapy] : Dialectical Behavior Therapy  [Motivational Interviewing] : Motivational Interviewing  [Psychodynamic Therapy] : Psychodynamic Therapy  [Supportive Therapy] : Supportive Therapy [de-identified] : Session began at 2:56pm and ended at 3:29pm, and they were seen via Solo audio/video session. She expressed she is feeling improvement in her self-image, recognizing that she has grown as a person, overcame issues in her past and loves herself more, attributing this to her connection with God. She continues to engage in self-care activities to ensure her mood remains elevated. She stated she has been compliant with medication and has been hearing the voices 'less and less', is able to ignore the negative commands, citing her Jewish as a protective factor.  [FreeTextEntry1] : Patient will continue to attend therapy to discuss symptoms and management of these symptoms. Patient will contact therapist if they are experiencing an increase in symptoms or difficulty in managing. Next virtual session is scheduled for 8/22 at .

## 2024-08-08 NOTE — END OF VISIT
[Individual Psychotherapy for 16-37 minutes] : Individual Psychotherapy for 16-37 minutes [Teletherapy Service Provided] : The services provided in this session were delivered via tele-therapy [Duration of Psychotherapy Visit (minutes spent in synchronous communication): ____] : Duration of Psychotherapy Visit (minutes spent in synchronous communication): [unfilled] [FreeTextEntry3] : [address] [facility], [place] [address] [FreeTextEntry5] : Remote telehealth hub

## 2024-08-08 NOTE — PLAN
[Recommended Frequency of Visits: ____] : Recommended frequency of visits: [unfilled] [Return in ____ week(s)] : Return in [unfilled] week(s) [FreeTextEntry2] : Problem/Need I:   Domain for Problem/Need I: Mental Health.   Problem: Self Image/ PTSD.   Objective A: Patient will utilize positive self talk daily at least 50% of the time, and discuss events in life that lead to the negative self image.   Objective B: Patient will decrease SAMRA-7 by 1 point and report feeling more positive about self and less worried.    Problem/Need II: Domain for Problem/Need II: Physical Health. Problem: Patient will maintain overall physical health and well being, and will address medical needs as necessary. Objective A: Patient will attend all medical appointments as scheduled. Objective B: Patient will exercise at least once a week to be more physically fit. [Dialectical Behavior Therapy] : Dialectical Behavior Therapy  [Motivational Interviewing] : Motivational Interviewing  [Psychodynamic Therapy] : Psychodynamic Therapy  [Supportive Therapy] : Supportive Therapy [de-identified] : Session began at 2:56pm and ended at 3:29pm, and they were seen via Solo audio/video session. She expressed she is feeling improvement in her self-image, recognizing that she has grown as a person, overcame issues in her past and loves herself more, attributing this to her connection with God. She continues to engage in self-care activities to ensure her mood remains elevated. She stated she has been compliant with medication and has been hearing the voices 'less and less', is able to ignore the negative commands, citing her Confucianism as a protective factor.  [FreeTextEntry1] : Patient will continue to attend therapy to discuss symptoms and management of these symptoms. Patient will contact therapist if they are experiencing an increase in symptoms or difficulty in managing. Next virtual session is scheduled for 8/22 at .

## 2024-08-09 DIAGNOSIS — F25.0 SCHIZOAFFECTIVE DISORDER, BIPOLAR TYPE: ICD-10-CM

## 2024-08-09 DIAGNOSIS — F43.10 POST-TRAUMATIC STRESS DISORDER, UNSPECIFIED: ICD-10-CM

## 2024-08-22 ENCOUNTER — APPOINTMENT (OUTPATIENT)
Dept: PSYCHIATRY | Facility: CLINIC | Age: 49
End: 2024-08-22

## 2024-08-22 ENCOUNTER — OUTPATIENT (OUTPATIENT)
Dept: OUTPATIENT SERVICES | Facility: HOSPITAL | Age: 49
LOS: 1 days | End: 2024-08-22
Payer: COMMERCIAL

## 2024-08-22 DIAGNOSIS — F25.0 SCHIZOAFFECTIVE DISORDER, BIPOLAR TYPE: ICD-10-CM

## 2024-08-22 DIAGNOSIS — F43.10 POST-TRAUMATIC STRESS DISORDER, UNSPECIFIED: ICD-10-CM

## 2024-08-22 PROCEDURE — 90832 PSYTX W PT 30 MINUTES: CPT

## 2024-08-22 NOTE — PLAN
[Dialectical Behavior Therapy] : Dialectical Behavior Therapy  [Motivational Interviewing] : Motivational Interviewing  [Psychoeducation] : Psychoeducation  [Supportive Therapy] : Supportive Therapy [Recommended Frequency of Visits: ____] : Recommended frequency of visits: [unfilled] [Return in ____ week(s)] : Return in [unfilled] week(s) [FreeTextEntry2] : Problem/Need I:   Domain for Problem/Need I: Mental Health.   Problem: Self Image/ PTSD.   Objective A: Patient will utilize positive self talk daily at least 50% of the time, and discuss events in life that lead to the negative self image.   Objective B: Patient will decrease SAMRA-7 by 1 point and report feeling more positive about self and less worried.    Problem/Need II: Domain for Problem/Need II: Physical Health. Problem: Patient will maintain overall physical health and well being, and will address medical needs as necessary. Objective A: Patient will attend all medical appointments as scheduled. Objective B: Patient will exercise at least once a week to be more physically fit. [de-identified] : Session began at 3:00pm and ended at 3:30pm, and they were seen via Solo audio/video session. She feels her anxiety has improved, would like to address the clutter within her room as she feels she has been hoarding more items, plans to organize and has tools to help her get organized. She stated she has been noticing this to be problematic, getting into old habits, but expressed she is happy with the progress that she has made from her past. Therapist explored with her the ways in which she can break this goal into smaller, easier attainable goals. She expressed pride in herself in being able to drive herself on the highway for vacation. She reported she is compliant with medication and continues to attend psychiatry appointments. [FreeTextEntry1] : Patient will continue to attend therapy to discuss symptoms and management of these symptoms. Patient will contact therapist if they are experiencing an increase in symptoms or difficulty in managing. Next virtual session is scheduled for 9/4 at 3p.

## 2024-08-22 NOTE — END OF VISIT
[Duration of Psychotherapy Visit (minutes spent in synchronous communication): ____] : Duration of Psychotherapy Visit (minutes spent in synchronous communication): [unfilled] [Individual Psychotherapy for 16-37 minutes] : Individual Psychotherapy for 16-37 minutes [Teletherapy Service Provided] : The services provided in this session were delivered via tele-therapy [FreeTextEntry3] : [address] [facility], [place] [address] [FreeTextEntry5] : Remote telehealth hub

## 2024-08-22 NOTE — PLAN
[Dialectical Behavior Therapy] : Dialectical Behavior Therapy  [Motivational Interviewing] : Motivational Interviewing  [Psychoeducation] : Psychoeducation  [Supportive Therapy] : Supportive Therapy [Recommended Frequency of Visits: ____] : Recommended frequency of visits: [unfilled] [Return in ____ week(s)] : Return in [unfilled] week(s) [FreeTextEntry2] : Problem/Need I:   Domain for Problem/Need I: Mental Health.   Problem: Self Image/ PTSD.   Objective A: Patient will utilize positive self talk daily at least 50% of the time, and discuss events in life that lead to the negative self image.   Objective B: Patient will decrease SAMRA-7 by 1 point and report feeling more positive about self and less worried.    Problem/Need II: Domain for Problem/Need II: Physical Health. Problem: Patient will maintain overall physical health and well being, and will address medical needs as necessary. Objective A: Patient will attend all medical appointments as scheduled. Objective B: Patient will exercise at least once a week to be more physically fit. [de-identified] : Session began at 3:00pm and ended at 3:30pm, and they were seen via Solo audio/video session. She feels her anxiety has improved, would like to address the clutter within her room as she feels she has been hoarding more items, plans to organize and has tools to help her get organized. She stated she has been noticing this to be problematic, getting into old habits, but expressed she is happy with the progress that she has made from her past. Therapist explored with her the ways in which she can break this goal into smaller, easier attainable goals. She expressed pride in herself in being able to drive herself on the highway for vacation. She reported she is compliant with medication and continues to attend psychiatry appointments. [FreeTextEntry1] : Patient will continue to attend therapy to discuss symptoms and management of these symptoms. Patient will contact therapist if they are experiencing an increase in symptoms or difficulty in managing. Next virtual session is scheduled for 9/4 at 3p.

## 2024-08-22 NOTE — END OF VISIT
[Duration of Psychotherapy Visit (minutes spent in synchronous communication): ____] : Duration of Psychotherapy Visit (minutes spent in synchronous communication): [unfilled] [Individual Psychotherapy for 16-37 minutes] : Individual Psychotherapy for 16-37 minutes [Teletherapy Service Provided] : The services provided in this session were delivered via tele-therapy [FreeTextEntry3] : [address] [facility], [place] [address] [FreeTextEntry5] : Remote telehealth hub

## 2024-08-22 NOTE — REASON FOR VISIT
[Patient preference] : as per patient preference [Continuing, patient seen in-person within last 12 months] : Telehealth services are continuing as patient has been seen in-person within last 12 months. [Telehealth (audio & video) - Individual/Group] : This visit was provided via telehealth using real-time 2-way audio visual technology. [Other Location: e.g. Home (Enter Location, City,State)___] : The provider was located at [unfilled]. [Home] : The patient, [unfilled], was located at home, [unfilled], at the time of the visit. [Verbal consent obtained from patient/other participant(s)] : Verbal consent for telehealth/telephonic services obtained from patient/other participant(s) [Patient] : Patient [FreeTextEntry4] : 3:00pm [FreeTextEntry5] : 3:30pm [FreeTextEntry2] : 1/31/24 [FreeTextEntry1] : Psychotherapy follow up

## 2024-08-23 DIAGNOSIS — F25.0 SCHIZOAFFECTIVE DISORDER, BIPOLAR TYPE: ICD-10-CM

## 2024-08-23 DIAGNOSIS — F43.10 POST-TRAUMATIC STRESS DISORDER, UNSPECIFIED: ICD-10-CM

## 2024-09-04 ENCOUNTER — APPOINTMENT (OUTPATIENT)
Dept: PSYCHIATRY | Facility: CLINIC | Age: 49
End: 2024-09-04

## 2024-09-04 ENCOUNTER — OUTPATIENT (OUTPATIENT)
Dept: OUTPATIENT SERVICES | Facility: HOSPITAL | Age: 49
LOS: 1 days | End: 2024-09-04
Payer: COMMERCIAL

## 2024-09-04 DIAGNOSIS — F43.10 POST-TRAUMATIC STRESS DISORDER, UNSPECIFIED: ICD-10-CM

## 2024-09-04 DIAGNOSIS — F33.1 MAJOR DEPRESSIVE DISORDER, RECURRENT, MODERATE: ICD-10-CM

## 2024-09-04 DIAGNOSIS — F25.0 SCHIZOAFFECTIVE DISORDER, BIPOLAR TYPE: ICD-10-CM

## 2024-09-04 PROCEDURE — 90832 PSYTX W PT 30 MINUTES: CPT

## 2024-09-04 NOTE — END OF VISIT
[Duration of Psychotherapy Visit (minutes spent in synchronous communication): ____] : Duration of Psychotherapy Visit (minutes spent in synchronous communication): [unfilled] [Individual Psychotherapy for 16-37 minutes] : Individual Psychotherapy for 16-37 minutes [Teletherapy Service Provided] : The services provided in this session were delivered via tele-therapy [FreeTextEntry3] : [address] , , [place] [address] [FreeTextEntry5] : Remote telehealth hub

## 2024-09-04 NOTE — END OF VISIT
[Duration of Psychotherapy Visit (minutes spent in synchronous communication): ____] : Duration of Psychotherapy Visit (minutes spent in synchronous communication): [unfilled] [Individual Psychotherapy for 16-37 minutes] : Individual Psychotherapy for 16-37 minutes [Teletherapy Service Provided] : The services provided in this session were delivered via tele-therapy [FreeTextEntry3] : [address] , , [place] [address] [FreeTextEntry5] : Remote telehealth hub

## 2024-09-04 NOTE — PLAN
[Motivational Interviewing] : Motivational Interviewing  [Supportive Therapy] : Supportive Therapy [Recommended Frequency of Visits: ____] : Recommended frequency of visits: [unfilled] [Return in ____ week(s)] : Return in [unfilled] week(s) [FreeTextEntry2] : Problem/Need I:   Domain for Problem/Need I: Mental Health.   Problem: Self Image/ PTSD.   Objective A: Patient will utilize positive self talk daily at least 50% of the time, and discuss events in life that lead to the negative self image.   Objective B: Patient will decrease SAMRA-7 by 1 point and report feeling more positive about self and less worried.    Problem/Need II: Domain for Problem/Need II: Physical Health. Problem: Patient will maintain overall physical health and well being, and will address medical needs as necessary. Objective A: Patient will attend all medical appointments as scheduled. Objective B: Patient will exercise at least once a week to be more physically fit. [Dialectical Behavior Therapy] : Dialectical Behavior Therapy  [de-identified] : Session began at 2:59pm and ended at 3:19pm, and they were seen via Solo audio/video session. She reported feeling she is in a good place, would like to work on her finances. She acknowledged that she feels buying an abundance of programs is due to her self-esteem and noted she has been setting boundaries with buying them and completing the programs she has. She is trying to adjust the cycle she feels she is in and has been trying to make changes while addressing her self-esteem. She continues to feel unheard and dismissed by her brother and father, stating this has been a role she has in her family since she was younger and is eager to grow and change with self-love.  [FreeTextEntry1] : Patient will continue to attend therapy to discuss symptoms and management of these symptoms. Patient will contact therapist if they are experiencing an increase in symptoms or difficulty in managing. Next virtual session is scheduled for 9/18 at .

## 2024-09-04 NOTE — REASON FOR VISIT
[Patient preference] : as per patient preference [Other Location: e.g. Home (Enter Location, City,State)___] : The patient, [unfilled], was located at [unfilled] at the time of the visit. [Verbal consent obtained from patient/other participant(s)] : Verbal consent for telehealth/telephonic services obtained from patient/other participant(s) [Patient] : Patient [Continuing, patient seen in-person within last 12 months] : Telehealth services are continuing as patient has been seen in-person within last 12 months. [Telehealth (audio & video) - Individual/Group] : This visit was provided via telehealth using real-time 2-way audio visual technology. [FreeTextEntry4] : 2:59pm [FreeTextEntry5] : 3:19pm [FreeTextEntry2] : 1/31/24 [FreeTextEntry1] : Psychotherapy follow up

## 2024-09-04 NOTE — PLAN
[Motivational Interviewing] : Motivational Interviewing  [Supportive Therapy] : Supportive Therapy [Recommended Frequency of Visits: ____] : Recommended frequency of visits: [unfilled] [Return in ____ week(s)] : Return in [unfilled] week(s) [FreeTextEntry2] : Problem/Need I:   Domain for Problem/Need I: Mental Health.   Problem: Self Image/ PTSD.   Objective A: Patient will utilize positive self talk daily at least 50% of the time, and discuss events in life that lead to the negative self image.   Objective B: Patient will decrease SAMRA-7 by 1 point and report feeling more positive about self and less worried.    Problem/Need II: Domain for Problem/Need II: Physical Health. Problem: Patient will maintain overall physical health and well being, and will address medical needs as necessary. Objective A: Patient will attend all medical appointments as scheduled. Objective B: Patient will exercise at least once a week to be more physically fit. [Dialectical Behavior Therapy] : Dialectical Behavior Therapy  [de-identified] : Session began at 2:59pm and ended at 3:19pm, and they were seen via Solo audio/video session. She reported feeling she is in a good place, would like to work on her finances. She acknowledged that she feels buying an abundance of programs is due to her self-esteem and noted she has been setting boundaries with buying them and completing the programs she has. She is trying to adjust the cycle she feels she is in and has been trying to make changes while addressing her self-esteem. She continues to feel unheard and dismissed by her brother and father, stating this has been a role she has in her family since she was younger and is eager to grow and change with self-love.  [FreeTextEntry1] : Patient will continue to attend therapy to discuss symptoms and management of these symptoms. Patient will contact therapist if they are experiencing an increase in symptoms or difficulty in managing. Next virtual session is scheduled for 9/18 at .

## 2024-09-05 DIAGNOSIS — F25.0 SCHIZOAFFECTIVE DISORDER, BIPOLAR TYPE: ICD-10-CM

## 2024-09-05 DIAGNOSIS — F43.10 POST-TRAUMATIC STRESS DISORDER, UNSPECIFIED: ICD-10-CM

## 2024-09-18 ENCOUNTER — APPOINTMENT (OUTPATIENT)
Dept: PSYCHIATRY | Facility: CLINIC | Age: 49
End: 2024-09-18

## 2024-09-18 ENCOUNTER — OUTPATIENT (OUTPATIENT)
Dept: OUTPATIENT SERVICES | Facility: HOSPITAL | Age: 49
LOS: 1 days | End: 2024-09-18
Payer: COMMERCIAL

## 2024-09-18 DIAGNOSIS — F25.0 SCHIZOAFFECTIVE DISORDER, BIPOLAR TYPE: ICD-10-CM

## 2024-09-18 PROCEDURE — 90832 PSYTX W PT 30 MINUTES: CPT

## 2024-09-18 NOTE — RISK ASSESSMENT
Becca Hicks (PA) notified second covid swab ordered [No, patient denies ideation or behavior] : No, patient denies ideation or behavior [Low acute suicide risk] : Low acute suicide risk [No] : No [Not clinically indicated] : Safety Plan completed/updated (for individuals at risk): Not clinically indicated

## 2024-09-19 DIAGNOSIS — F25.0 SCHIZOAFFECTIVE DISORDER, BIPOLAR TYPE: ICD-10-CM

## 2024-09-19 NOTE — PLAN
[Recommended Frequency of Visits: ____] : Recommended frequency of visits: [unfilled] [Return in ____ week(s)] : Return in [unfilled] week(s) [Dialectical Behavior Therapy] : Dialectical Behavior Therapy  [FreeTextEntry2] : Problem/Need I:   Domain for Problem/Need I: Mental Health.   Problem: Self Image/ PTSD.   Objective A: Patient will utilize positive self talk daily at least 50% of the time, and discuss events in life that lead to the negative self image.   Objective B: Patient will decrease SAMRA-7 by 1 point and report feeling more positive about self and less worried.    Problem/Need II: Domain for Problem/Need II: Physical Health. Problem: Patient will maintain overall physical health and well being, and will address medical needs as necessary. Objective A: Patient will attend all medical appointments as scheduled. Objective B: Patient will exercise at least once a week to be more physically fit. [Supportive Therapy] : Supportive Therapy [Motivational Interviewing] : Motivational Interviewing  [de-identified] : Session began at 3:00pm and ended at 3:30pm, and they were seen in-person/ via Solo audio/video session. She stated she just completed a visit at the dentist as she cracked her tooth and needs surgery to correct it. She noted she has been attempting to complete programs, feeling more confident in self, would like to continue to address self esteem and accomplish goals more confidently. She expressed continuing to feel such an urge to be her own boss and follow her dreams, and is stepping out of the 'role my family gave me'. Therapist explored this with her and how she is able to build her self-esteem; she tries to use positive self talk which helps at times, but notes she has a negative voice that stems from childhood. Resources were emailed to her at [email or web address] to help improve self-esteem and develop a growth mindset. She reports she has been able following up with medical providers as needed, fitting them into her work schedule.   [FreeTextEntry1] : Patient will continue to attend therapy to discuss symptoms and management of these symptoms. Patient will contact therapist if they are experiencing an increase in symptoms or difficulty in managing. Next virtual session is scheduled for 10/2/24 at .

## 2024-09-19 NOTE — PLAN
[Recommended Frequency of Visits: ____] : Recommended frequency of visits: [unfilled] [Return in ____ week(s)] : Return in [unfilled] week(s) [Dialectical Behavior Therapy] : Dialectical Behavior Therapy  [FreeTextEntry2] : Problem/Need I:   Domain for Problem/Need I: Mental Health.   Problem: Self Image/ PTSD.   Objective A: Patient will utilize positive self talk daily at least 50% of the time, and discuss events in life that lead to the negative self image.   Objective B: Patient will decrease SAMRA-7 by 1 point and report feeling more positive about self and less worried.    Problem/Need II: Domain for Problem/Need II: Physical Health. Problem: Patient will maintain overall physical health and well being, and will address medical needs as necessary. Objective A: Patient will attend all medical appointments as scheduled. Objective B: Patient will exercise at least once a week to be more physically fit. [Motivational Interviewing] : Motivational Interviewing  [Supportive Therapy] : Supportive Therapy [de-identified] : Session began at 3:00pm and ended at 3:30pm, and they were seen in-person/ via Solo audio/video session. She stated she just completed a visit at the dentist as she cracked her tooth and needs surgery to correct it. She noted she has been attempting to complete programs, feeling more confident in self, would like to continue to address self esteem and accomplish goals more confidently. She expressed continuing to feel such an urge to be her own boss and follow her dreams, and is stepping out of the 'role my family gave me'. Therapist explored this with her and how she is able to build her self-esteem; she tries to use positive self talk which helps at times, but notes she has a negative voice that stems from childhood. Resources were emailed to her at [email or web address] to help improve self-esteem and develop a growth mindset. She reports she has been able following up with medical providers as needed, fitting them into her work schedule.   [FreeTextEntry1] : Patient will continue to attend therapy to discuss symptoms and management of these symptoms. Patient will contact therapist if they are experiencing an increase in symptoms or difficulty in managing. Next virtual session is scheduled for 10/2/24 at .

## 2024-09-19 NOTE — END OF VISIT
[Duration of Psychotherapy Visit (minutes spent in synchronous communication): ____] : Duration of Psychotherapy Visit (minutes spent in synchronous communication): [unfilled] [Individual Psychotherapy for 16-37 minutes] : Individual Psychotherapy for 16-37 minutes [Teletherapy Service Provided] : The services provided in this session were delivered via tele-therapy [FreeTextEntry3] : car parked outside [address] [place] [address] [FreeTextEntry5] : Remote telehealth hub

## 2024-09-19 NOTE — REASON FOR VISIT
[Patient preference] : as per patient preference [Continuing, patient seen in-person within last 12 months] : Telehealth services are continuing as patient has been seen in-person within last 12 months. [Telehealth (audio & video) - Individual/Group] : This visit was provided via telehealth using real-time 2-way audio visual technology. [Other Location: e.g. Home (Enter Location, City,State)___] : The patient, [unfilled], was located at [unfilled] at the time of the visit. [Patient's space is appropriate for telehealth and maintains privacy/confidentiality.] : Patient's space is appropriate for telehealth and maintains privacy/confidentiality. [Verbal consent obtained from patient/other participant(s)] : Verbal consent for telehealth/telephonic services obtained from patient/other participant(s) [Patient] : Patient [FreeTextEntry4] : 3:00pm  [FreeTextEntry5] : 3:30pm [FreeTextEntry2] : 1/31/24 [FreeTextEntry1] : Psychotherapy follow up

## 2024-09-19 NOTE — END OF VISIT
[Duration of Psychotherapy Visit (minutes spent in synchronous communication): ____] : Duration of Psychotherapy Visit (minutes spent in synchronous communication): [unfilled] [Individual Psychotherapy for 16-37 minutes] : Individual Psychotherapy for 16-37 minutes [Teletherapy Service Provided] : The services provided in this session were delivered via tele-therapy [FreeTextEntry3] : car parked outside [address] [place] [address] [FreeTextEntry5] : Remote telehealth hub

## 2024-09-19 NOTE — PLAN
[Recommended Frequency of Visits: ____] : Recommended frequency of visits: [unfilled] [Return in ____ week(s)] : Return in [unfilled] week(s) [Dialectical Behavior Therapy] : Dialectical Behavior Therapy  [FreeTextEntry2] : Problem/Need I:   Domain for Problem/Need I: Mental Health.   Problem: Self Image/ PTSD.   Objective A: Patient will utilize positive self talk daily at least 50% of the time, and discuss events in life that lead to the negative self image.   Objective B: Patient will decrease SAMRA-7 by 1 point and report feeling more positive about self and less worried.    Problem/Need II: Domain for Problem/Need II: Physical Health. Problem: Patient will maintain overall physical health and well being, and will address medical needs as necessary. Objective A: Patient will attend all medical appointments as scheduled. Objective B: Patient will exercise at least once a week to be more physically fit. [Supportive Therapy] : Supportive Therapy [Motivational Interviewing] : Motivational Interviewing  [de-identified] : Session began at 3:00pm and ended at 3:30pm, and they were seen in-person/ via Solo audio/video session. She stated she just completed a visit at the dentist as she cracked her tooth and needs surgery to correct it. She noted she has been attempting to complete programs, feeling more confident in self, would like to continue to address self esteem and accomplish goals more confidently. She expressed continuing to feel such an urge to be her own boss and follow her dreams, and is stepping out of the 'role my family gave me'. Therapist explored this with her and how she is able to build her self-esteem; she tries to use positive self talk which helps at times, but notes she has a negative voice that stems from childhood. Resources were emailed to her at [email or web address] to help improve self-esteem and develop a growth mindset. She reports she has been able following up with medical providers as needed, fitting them into her work schedule.   [FreeTextEntry1] : Patient will continue to attend therapy to discuss symptoms and management of these symptoms. Patient will contact therapist if they are experiencing an increase in symptoms or difficulty in managing. Next virtual session is scheduled for 10/2/24 at .

## 2024-09-19 NOTE — END OF VISIT
[Duration of Psychotherapy Visit (minutes spent in synchronous communication): ____] : Duration of Psychotherapy Visit (minutes spent in synchronous communication): [unfilled] [Individual Psychotherapy for 16-37 minutes] : Individual Psychotherapy for 16-37 minutes [Teletherapy Service Provided] : The services provided in this session were delivered via tele-therapy [FreeTextEntry3] : car parked outside [address] [place] [address] [FreeTextEntry5] : Remote telehealth hub

## 2024-10-02 ENCOUNTER — APPOINTMENT (OUTPATIENT)
Dept: PSYCHIATRY | Facility: CLINIC | Age: 49
End: 2024-10-02

## 2024-10-02 NOTE — REASON FOR VISIT
[Patient preference] : as per patient preference [Continuing, patient seen in-person within last 12 months] : Telehealth services are continuing as patient has been seen in-person within last 12 months. [Telehealth (audio & video) - Individual/Group] : This visit was provided via telehealth using real-time 2-way audio visual technology. [Other Location: e.g. Home (Enter Location, City,State)___] : The provider was located at [unfilled]. [Home] : The patient, [unfilled], was located at home, [unfilled], at the time of the visit. [Patient's space is appropriate for telehealth and maintains privacy/confidentiality.] : Patient's space is appropriate for telehealth and maintains privacy/confidentiality. [Verbal consent obtained from patient/other participant(s)] : Verbal consent for telehealth/telephonic services obtained from patient/other participant(s) [FreeTextEntry4] : 3:01pm [FreeTextEntry5] : 3:35pm [FreeTextEntry2] : 1/31/24 [Patient] : Patient [FreeTextEntry1] : Psychotherapy follow up

## 2024-10-02 NOTE — PLAN
[FreeTextEntry2] : Problem/Need I:   Domain for Problem/Need I: Mental Health.   Problem: Self Image/ PTSD.   Objective A: Patient will utilize positive self talk daily at least 50% of the time, and discuss events in life that lead to the negative self image.   Objective B: Patient will decrease SAMRA-7 by 1 point and report feeling more positive about self and less worried.    Problem/Need II: Domain for Problem/Need II: Physical Health. Problem: Patient will maintain overall physical health and well being, and will address medical needs as necessary. Objective A: Patient will attend all medical appointments as scheduled. Objective B: Patient will exercise at least once a week to be more physically fit. [Motivational Interviewing] : Motivational Interviewing  [Psychoeducation] : Psychoeducation  [Supportive Therapy] : Supportive Therapy [de-identified] : Session began at 3:01pm and ended at 3:35pm, and they were seen via Solo audio/video session. She expressed she has lost weight recently and is happy about this for her physical health. She has been trying to manage her financial budget, finding this difficult as she is afraid if she puts it away, she will not be able to access it; typically spends and not save it which she would like to improve. She is wanting to change her relationship with money and have a better understanding of budgeting and saving money without an end goal in sight.  Therapist assisted her in processing her emotions of feeling like she needs to fill a void, unworthy and lacking self-confidence.  [Recommended Frequency of Visits: ____] : Recommended frequency of visits: [unfilled] [Return in ____ week(s)] : Return in [unfilled] week(s) [FreeTextEntry1] : Patient will continue to attend therapy to discuss symptoms and management of these symptoms. Patient will contact therapist if they are experiencing an increase in symptoms or difficulty in managing. Next virtual session is scheduled for 10/16/24 at .

## 2024-10-02 NOTE — END OF VISIT
Patient: Melissa Quan Date: 2023   : 1953 Attending: Nelly Welch MD   70 year old female        Operation/Procedure: TVH, USLS, AR, sling, cysto  Post-op Days: 1    Subjective: Pain controlled and Tolerating diet. Had nausea last night, has since resolved. Tolerating breakfast this AM without issue. Minimal vaginal bleeding since pack removed.    Vitals:  Vital Last Value 24 Hour Range   Temperature 98.4 °F (36.9 °C) (23) Temp  Min: 97 °F (36.1 °C)  Max: 99.8 °F (37.7 °C)   Pulse 90 (23) Pulse  Min: 63  Max: 103   Respiratory 16 (23) Resp  Min: 8  Max: 17   Non-Invasive  Blood Pressure 126/58 (23) BP  Min: 126/58  Max: 195/77   Pulse Oximetry 96 % (23) SpO2  Min: 90 %  Max: 100 %   CVP   No data recorded     Vital Today Admit   Weight 64.7 kg (142 lb 10.2 oz) (23) Weight: 64.7 kg (142 lb 10.2 oz) (23)   Height N/A Height: 5' 2\" (157.5 cm) (23)   BMI N/A BMI (Calculated): 26.09 (23)       Physical Exam:   No acute distress, sitting up on side of bed  Normal rate, normal peripheral perfusion  Non-labored respirations    Laboratory Results:    Lab Results   Component Value Date    WBC 5.4 2014    HCT 38.0 2023    HGB 12.9 2023     2014    BUN 12 2022    CREATININE 0.69 2022    SODIUM 140 2022    POTASSIUM 4.4 2022    CHLORIDE 107 2022    AST 26 2022    ALKPT 76 2022    BILIRUBIN 0.6 2022    CO2 28 2022    GPT 31 2022    GLUCOSE 99 2022    ALBUMIN 3.7 2022       A/P:   - Heme:  EBL 50 cc, hgb 12.9 this AM   - Pain:  Controlled without narcotics   - Neely:  Neely out. Successful voiding trial.   - DVT ppx:  Lovenox while in house    Dispo: Discharge home today if able to ambulate around the unit without issue. She will let me know if she would like an Rx for stronger pain meds (advised Tylenol and  [Duration of Psychotherapy Visit (minutes spent in synchronous communication): ____] : Duration of Psychotherapy Visit (minutes spent in synchronous communication): [unfilled] [Individual Psychotherapy for 16-37 minutes] : Individual Psychotherapy for 16-37 minutes ibuprofen at home).     [Teletherapy Service Provided] : The services provided in this session were delivered via tele-therapy [FreeTextEntry3] : [address] [facility] [place], [place] [address] [FreeTextEntry5] : Remote telehealth hub none

## 2024-10-02 NOTE — PLAN
[FreeTextEntry2] : Problem/Need I:   Domain for Problem/Need I: Mental Health.   Problem: Self Image/ PTSD.   Objective A: Patient will utilize positive self talk daily at least 50% of the time, and discuss events in life that lead to the negative self image.   Objective B: Patient will decrease SAMRA-7 by 1 point and report feeling more positive about self and less worried.    Problem/Need II: Domain for Problem/Need II: Physical Health. Problem: Patient will maintain overall physical health and well being, and will address medical needs as necessary. Objective A: Patient will attend all medical appointments as scheduled. Objective B: Patient will exercise at least once a week to be more physically fit. [Motivational Interviewing] : Motivational Interviewing  [Psychoeducation] : Psychoeducation  [Supportive Therapy] : Supportive Therapy [de-identified] : Session began at 3:01pm and ended at 3:35pm, and they were seen via Solo audio/video session. She expressed she has lost weight recently and is happy about this for her physical health. She has been trying to manage her financial budget, finding this difficult as she is afraid if she puts it away, she will not be able to access it; typically spends and not save it which she would like to improve. She is wanting to change her relationship with money and have a better understanding of budgeting and saving money without an end goal in sight.  Therapist assisted her in processing her emotions of feeling like she needs to fill a void, unworthy and lacking self-confidence.  [Recommended Frequency of Visits: ____] : Recommended frequency of visits: [unfilled] [Return in ____ week(s)] : Return in [unfilled] week(s) [FreeTextEntry1] : Patient will continue to attend therapy to discuss symptoms and management of these symptoms. Patient will contact therapist if they are experiencing an increase in symptoms or difficulty in managing. Next virtual session is scheduled for 10/16/24 at .

## 2024-10-02 NOTE — END OF VISIT
[Duration of Psychotherapy Visit (minutes spent in synchronous communication): ____] : Duration of Psychotherapy Visit (minutes spent in synchronous communication): [unfilled] [Individual Psychotherapy for 16-37 minutes] : Individual Psychotherapy for 16-37 minutes [Teletherapy Service Provided] : The services provided in this session were delivered via tele-therapy [FreeTextEntry3] : [address] [facility], [place] [address] [FreeTextEntry5] : Remote telehealth hub

## 2024-10-09 ENCOUNTER — APPOINTMENT (OUTPATIENT)
Dept: PSYCHIATRY | Facility: CLINIC | Age: 49
End: 2024-10-09

## 2024-10-16 ENCOUNTER — OUTPATIENT (OUTPATIENT)
Dept: OUTPATIENT SERVICES | Facility: HOSPITAL | Age: 49
LOS: 1 days | End: 2024-10-16
Payer: SELF-PAY

## 2024-10-16 ENCOUNTER — APPOINTMENT (OUTPATIENT)
Dept: PSYCHIATRY | Facility: CLINIC | Age: 49
End: 2024-10-16

## 2024-10-16 ENCOUNTER — NON-APPOINTMENT (OUTPATIENT)
Age: 49
End: 2024-10-16

## 2024-10-16 DIAGNOSIS — F25.0 SCHIZOAFFECTIVE DISORDER, BIPOLAR TYPE: ICD-10-CM

## 2024-10-16 PROCEDURE — 90832 PSYTX W PT 30 MINUTES: CPT

## 2024-10-17 DIAGNOSIS — F25.0 SCHIZOAFFECTIVE DISORDER, BIPOLAR TYPE: ICD-10-CM

## 2024-10-18 ENCOUNTER — APPOINTMENT (OUTPATIENT)
Dept: PSYCHIATRY | Facility: CLINIC | Age: 49
End: 2024-10-18
Payer: COMMERCIAL

## 2024-10-18 ENCOUNTER — OUTPATIENT (OUTPATIENT)
Dept: OUTPATIENT SERVICES | Facility: HOSPITAL | Age: 49
LOS: 1 days | End: 2024-10-18
Payer: COMMERCIAL

## 2024-10-18 DIAGNOSIS — F25.0 SCHIZOAFFECTIVE DISORDER, BIPOLAR TYPE: ICD-10-CM

## 2024-10-18 PROCEDURE — 99214 OFFICE O/P EST MOD 30 MIN: CPT | Mod: 95

## 2024-10-19 DIAGNOSIS — F25.0 SCHIZOAFFECTIVE DISORDER, BIPOLAR TYPE: ICD-10-CM

## 2024-11-01 ENCOUNTER — APPOINTMENT (OUTPATIENT)
Dept: PSYCHIATRY | Facility: CLINIC | Age: 49
End: 2024-11-01

## 2024-11-01 ENCOUNTER — OUTPATIENT (OUTPATIENT)
Dept: OUTPATIENT SERVICES | Facility: HOSPITAL | Age: 49
LOS: 1 days | End: 2024-11-01
Payer: COMMERCIAL

## 2024-11-01 DIAGNOSIS — F25.0 SCHIZOAFFECTIVE DISORDER, BIPOLAR TYPE: ICD-10-CM

## 2024-11-01 DIAGNOSIS — F43.10 POST-TRAUMATIC STRESS DISORDER, UNSPECIFIED: ICD-10-CM

## 2024-11-01 PROCEDURE — 90832 PSYTX W PT 30 MINUTES: CPT

## 2024-11-02 DIAGNOSIS — F43.10 POST-TRAUMATIC STRESS DISORDER, UNSPECIFIED: ICD-10-CM

## 2024-11-02 DIAGNOSIS — F25.0 SCHIZOAFFECTIVE DISORDER, BIPOLAR TYPE: ICD-10-CM

## 2024-11-14 ENCOUNTER — APPOINTMENT (OUTPATIENT)
Dept: PSYCHIATRY | Facility: CLINIC | Age: 49
End: 2024-11-14

## 2024-11-14 ENCOUNTER — OUTPATIENT (OUTPATIENT)
Dept: OUTPATIENT SERVICES | Facility: HOSPITAL | Age: 49
LOS: 1 days | End: 2024-11-14
Payer: COMMERCIAL

## 2024-11-14 DIAGNOSIS — F25.0 SCHIZOAFFECTIVE DISORDER, BIPOLAR TYPE: ICD-10-CM

## 2024-11-14 PROCEDURE — 90832 PSYTX W PT 30 MINUTES: CPT

## 2024-11-15 DIAGNOSIS — F25.0 SCHIZOAFFECTIVE DISORDER, BIPOLAR TYPE: ICD-10-CM

## 2024-11-27 ENCOUNTER — APPOINTMENT (OUTPATIENT)
Dept: PSYCHIATRY | Facility: CLINIC | Age: 49
End: 2024-11-27

## 2024-12-12 ENCOUNTER — OUTPATIENT (OUTPATIENT)
Dept: OUTPATIENT SERVICES | Facility: HOSPITAL | Age: 49
LOS: 1 days | End: 2024-12-12
Payer: COMMERCIAL

## 2024-12-12 ENCOUNTER — APPOINTMENT (OUTPATIENT)
Dept: PSYCHIATRY | Facility: CLINIC | Age: 49
End: 2024-12-12

## 2024-12-12 DIAGNOSIS — F25.0 SCHIZOAFFECTIVE DISORDER, BIPOLAR TYPE: ICD-10-CM

## 2024-12-12 PROCEDURE — 90832 PSYTX W PT 30 MINUTES: CPT

## 2024-12-13 DIAGNOSIS — F25.0 SCHIZOAFFECTIVE DISORDER, BIPOLAR TYPE: ICD-10-CM

## 2025-01-09 ENCOUNTER — APPOINTMENT (OUTPATIENT)
Dept: PSYCHIATRY | Facility: CLINIC | Age: 50
End: 2025-01-09

## 2025-01-09 ENCOUNTER — OUTPATIENT (OUTPATIENT)
Dept: OUTPATIENT SERVICES | Facility: HOSPITAL | Age: 50
LOS: 1 days | End: 2025-01-09
Payer: COMMERCIAL

## 2025-01-09 DIAGNOSIS — F25.0 SCHIZOAFFECTIVE DISORDER, BIPOLAR TYPE: ICD-10-CM

## 2025-01-09 DIAGNOSIS — F43.10 POST-TRAUMATIC STRESS DISORDER, UNSPECIFIED: ICD-10-CM

## 2025-01-09 PROCEDURE — 90832 PSYTX W PT 30 MINUTES: CPT

## 2025-01-10 ENCOUNTER — OUTPATIENT (OUTPATIENT)
Dept: OUTPATIENT SERVICES | Facility: HOSPITAL | Age: 50
LOS: 1 days | End: 2025-01-10
Payer: COMMERCIAL

## 2025-01-10 ENCOUNTER — APPOINTMENT (OUTPATIENT)
Dept: PSYCHIATRY | Facility: CLINIC | Age: 50
End: 2025-01-10
Payer: COMMERCIAL

## 2025-01-10 DIAGNOSIS — F43.10 POST-TRAUMATIC STRESS DISORDER, UNSPECIFIED: ICD-10-CM

## 2025-01-10 DIAGNOSIS — F25.0 SCHIZOAFFECTIVE DISORDER, BIPOLAR TYPE: ICD-10-CM

## 2025-01-10 PROCEDURE — 98007 SYNCH AUDIO-VIDEO EST HI 40: CPT

## 2025-01-10 PROCEDURE — 99214 OFFICE O/P EST MOD 30 MIN: CPT | Mod: 95

## 2025-01-11 DIAGNOSIS — F25.0 SCHIZOAFFECTIVE DISORDER, BIPOLAR TYPE: ICD-10-CM

## 2025-01-23 ENCOUNTER — APPOINTMENT (OUTPATIENT)
Dept: PSYCHIATRY | Facility: CLINIC | Age: 50
End: 2025-01-23

## 2025-01-23 ENCOUNTER — OUTPATIENT (OUTPATIENT)
Dept: OUTPATIENT SERVICES | Facility: HOSPITAL | Age: 50
LOS: 1 days | End: 2025-01-23
Payer: COMMERCIAL

## 2025-01-23 DIAGNOSIS — F25.0 SCHIZOAFFECTIVE DISORDER, BIPOLAR TYPE: ICD-10-CM

## 2025-01-23 PROCEDURE — 90832 PSYTX W PT 30 MINUTES: CPT

## 2025-01-24 DIAGNOSIS — F25.0 SCHIZOAFFECTIVE DISORDER, BIPOLAR TYPE: ICD-10-CM

## 2025-02-06 ENCOUNTER — APPOINTMENT (OUTPATIENT)
Dept: PSYCHIATRY | Facility: CLINIC | Age: 50
End: 2025-02-06

## 2025-02-06 ENCOUNTER — OUTPATIENT (OUTPATIENT)
Dept: OUTPATIENT SERVICES | Facility: HOSPITAL | Age: 50
LOS: 1 days | End: 2025-02-06
Payer: COMMERCIAL

## 2025-02-06 DIAGNOSIS — F25.0 SCHIZOAFFECTIVE DISORDER, BIPOLAR TYPE: ICD-10-CM

## 2025-02-06 PROCEDURE — 90832 PSYTX W PT 30 MINUTES: CPT

## 2025-02-07 DIAGNOSIS — F25.0 SCHIZOAFFECTIVE DISORDER, BIPOLAR TYPE: ICD-10-CM

## 2025-02-20 ENCOUNTER — OUTPATIENT (OUTPATIENT)
Dept: OUTPATIENT SERVICES | Facility: HOSPITAL | Age: 50
LOS: 1 days | End: 2025-02-20
Payer: COMMERCIAL

## 2025-02-20 ENCOUNTER — APPOINTMENT (OUTPATIENT)
Dept: PSYCHIATRY | Facility: CLINIC | Age: 50
End: 2025-02-20

## 2025-02-20 DIAGNOSIS — F25.0 SCHIZOAFFECTIVE DISORDER, BIPOLAR TYPE: ICD-10-CM

## 2025-02-20 DIAGNOSIS — F31.74 BIPOLAR DISORDER, IN FULL REMISSION, MOST RECENT EPISODE MANIC: ICD-10-CM

## 2025-02-20 PROCEDURE — 90832 PSYTX W PT 30 MINUTES: CPT

## 2025-02-21 DIAGNOSIS — F25.0 SCHIZOAFFECTIVE DISORDER, BIPOLAR TYPE: ICD-10-CM

## 2025-03-06 ENCOUNTER — OUTPATIENT (OUTPATIENT)
Dept: OUTPATIENT SERVICES | Facility: HOSPITAL | Age: 50
LOS: 1 days | End: 2025-03-06
Payer: COMMERCIAL

## 2025-03-06 ENCOUNTER — APPOINTMENT (OUTPATIENT)
Dept: PSYCHIATRY | Facility: CLINIC | Age: 50
End: 2025-03-06

## 2025-03-06 DIAGNOSIS — F25.0 SCHIZOAFFECTIVE DISORDER, BIPOLAR TYPE: ICD-10-CM

## 2025-03-06 PROCEDURE — 90832 PSYTX W PT 30 MINUTES: CPT

## 2025-03-07 DIAGNOSIS — F25.0 SCHIZOAFFECTIVE DISORDER, BIPOLAR TYPE: ICD-10-CM

## 2025-03-19 ENCOUNTER — APPOINTMENT (OUTPATIENT)
Dept: PSYCHIATRY | Facility: CLINIC | Age: 50
End: 2025-03-19
Payer: COMMERCIAL

## 2025-03-19 ENCOUNTER — OUTPATIENT (OUTPATIENT)
Dept: OUTPATIENT SERVICES | Facility: HOSPITAL | Age: 50
LOS: 1 days | End: 2025-03-19
Payer: COMMERCIAL

## 2025-03-19 DIAGNOSIS — F25.0 SCHIZOAFFECTIVE DISORDER, BIPOLAR TYPE: ICD-10-CM

## 2025-03-19 DIAGNOSIS — F43.10 POST-TRAUMATIC STRESS DISORDER, UNSPECIFIED: ICD-10-CM

## 2025-03-19 PROCEDURE — 98007 SYNCH AUDIO-VIDEO EST HI 40: CPT

## 2025-03-19 PROCEDURE — 99214 OFFICE O/P EST MOD 30 MIN: CPT | Mod: 95

## 2025-03-20 ENCOUNTER — APPOINTMENT (OUTPATIENT)
Dept: PSYCHIATRY | Facility: CLINIC | Age: 50
End: 2025-03-20

## 2025-03-20 ENCOUNTER — OUTPATIENT (OUTPATIENT)
Dept: OUTPATIENT SERVICES | Facility: HOSPITAL | Age: 50
LOS: 1 days | End: 2025-03-20
Payer: COMMERCIAL

## 2025-03-20 DIAGNOSIS — F25.0 SCHIZOAFFECTIVE DISORDER, BIPOLAR TYPE: ICD-10-CM

## 2025-03-20 PROCEDURE — 90832 PSYTX W PT 30 MINUTES: CPT

## 2025-03-21 DIAGNOSIS — F25.0 SCHIZOAFFECTIVE DISORDER, BIPOLAR TYPE: ICD-10-CM

## 2025-04-03 ENCOUNTER — APPOINTMENT (OUTPATIENT)
Dept: PSYCHIATRY | Facility: CLINIC | Age: 50
End: 2025-04-03

## 2025-04-15 ENCOUNTER — NON-APPOINTMENT (OUTPATIENT)
Age: 50
End: 2025-04-15

## 2025-04-16 ENCOUNTER — APPOINTMENT (OUTPATIENT)
Dept: PSYCHIATRY | Facility: CLINIC | Age: 50
End: 2025-04-16

## 2025-04-16 ENCOUNTER — OUTPATIENT (OUTPATIENT)
Dept: OUTPATIENT SERVICES | Facility: HOSPITAL | Age: 50
LOS: 1 days | End: 2025-04-16
Payer: COMMERCIAL

## 2025-04-16 DIAGNOSIS — F25.0 SCHIZOAFFECTIVE DISORDER, BIPOLAR TYPE: ICD-10-CM

## 2025-04-16 PROCEDURE — 90832 PSYTX W PT 30 MINUTES: CPT

## 2025-04-17 DIAGNOSIS — F25.0 SCHIZOAFFECTIVE DISORDER, BIPOLAR TYPE: ICD-10-CM

## 2025-05-15 ENCOUNTER — OUTPATIENT (OUTPATIENT)
Dept: OUTPATIENT SERVICES | Facility: HOSPITAL | Age: 50
LOS: 1 days | End: 2025-05-15
Payer: COMMERCIAL

## 2025-05-15 ENCOUNTER — APPOINTMENT (OUTPATIENT)
Dept: PSYCHIATRY | Facility: CLINIC | Age: 50
End: 2025-05-15

## 2025-05-15 DIAGNOSIS — F25.0 SCHIZOAFFECTIVE DISORDER, BIPOLAR TYPE: ICD-10-CM

## 2025-05-15 PROCEDURE — 90832 PSYTX W PT 30 MINUTES: CPT

## 2025-05-16 DIAGNOSIS — F25.0 SCHIZOAFFECTIVE DISORDER, BIPOLAR TYPE: ICD-10-CM

## 2025-05-21 ENCOUNTER — OUTPATIENT (OUTPATIENT)
Dept: OUTPATIENT SERVICES | Facility: HOSPITAL | Age: 50
LOS: 1 days | End: 2025-05-21
Payer: COMMERCIAL

## 2025-05-21 ENCOUNTER — APPOINTMENT (OUTPATIENT)
Dept: PSYCHIATRY | Facility: CLINIC | Age: 50
End: 2025-05-21

## 2025-05-21 DIAGNOSIS — F25.0 SCHIZOAFFECTIVE DISORDER, BIPOLAR TYPE: ICD-10-CM

## 2025-05-21 DIAGNOSIS — F43.10 POST-TRAUMATIC STRESS DISORDER, UNSPECIFIED: ICD-10-CM

## 2025-05-21 PROCEDURE — 98007 SYNCH AUDIO-VIDEO EST HI 40: CPT

## 2025-05-21 PROCEDURE — 99214 OFFICE O/P EST MOD 30 MIN: CPT | Mod: 95

## 2025-05-22 DIAGNOSIS — F43.10 POST-TRAUMATIC STRESS DISORDER, UNSPECIFIED: ICD-10-CM

## 2025-05-22 DIAGNOSIS — F25.0 SCHIZOAFFECTIVE DISORDER, BIPOLAR TYPE: ICD-10-CM

## 2025-05-29 ENCOUNTER — APPOINTMENT (OUTPATIENT)
Dept: PSYCHIATRY | Facility: CLINIC | Age: 50
End: 2025-05-29

## 2025-05-29 ENCOUNTER — OUTPATIENT (OUTPATIENT)
Dept: OUTPATIENT SERVICES | Facility: HOSPITAL | Age: 50
LOS: 1 days | End: 2025-05-29
Payer: COMMERCIAL

## 2025-05-29 DIAGNOSIS — F25.0 SCHIZOAFFECTIVE DISORDER, BIPOLAR TYPE: ICD-10-CM

## 2025-05-29 PROCEDURE — 90832 PSYTX W PT 30 MINUTES: CPT

## 2025-05-30 DIAGNOSIS — F25.0 SCHIZOAFFECTIVE DISORDER, BIPOLAR TYPE: ICD-10-CM

## 2025-06-12 ENCOUNTER — OUTPATIENT (OUTPATIENT)
Dept: OUTPATIENT SERVICES | Facility: HOSPITAL | Age: 50
LOS: 1 days | End: 2025-06-12
Payer: COMMERCIAL

## 2025-06-12 ENCOUNTER — APPOINTMENT (OUTPATIENT)
Dept: PSYCHIATRY | Facility: CLINIC | Age: 50
End: 2025-06-12

## 2025-06-12 DIAGNOSIS — F25.0 SCHIZOAFFECTIVE DISORDER, BIPOLAR TYPE: ICD-10-CM

## 2025-06-12 PROCEDURE — 90832 PSYTX W PT 30 MINUTES: CPT

## 2025-06-13 DIAGNOSIS — F25.0 SCHIZOAFFECTIVE DISORDER, BIPOLAR TYPE: ICD-10-CM

## 2025-06-26 ENCOUNTER — APPOINTMENT (OUTPATIENT)
Dept: PSYCHIATRY | Facility: CLINIC | Age: 50
End: 2025-06-26

## 2025-07-10 ENCOUNTER — APPOINTMENT (OUTPATIENT)
Dept: PSYCHIATRY | Facility: CLINIC | Age: 50
End: 2025-07-10

## 2025-07-10 ENCOUNTER — OUTPATIENT (OUTPATIENT)
Dept: OUTPATIENT SERVICES | Facility: HOSPITAL | Age: 50
LOS: 1 days | End: 2025-07-10
Payer: COMMERCIAL

## 2025-07-10 DIAGNOSIS — F25.0 SCHIZOAFFECTIVE DISORDER, BIPOLAR TYPE: ICD-10-CM

## 2025-07-10 PROCEDURE — 90832 PSYTX W PT 30 MINUTES: CPT

## 2025-07-11 DIAGNOSIS — F25.0 SCHIZOAFFECTIVE DISORDER, BIPOLAR TYPE: ICD-10-CM

## 2025-07-18 ENCOUNTER — OUTPATIENT (OUTPATIENT)
Dept: OUTPATIENT SERVICES | Facility: HOSPITAL | Age: 50
LOS: 1 days | End: 2025-07-18
Payer: COMMERCIAL

## 2025-07-18 ENCOUNTER — APPOINTMENT (OUTPATIENT)
Dept: PSYCHIATRY | Facility: CLINIC | Age: 50
End: 2025-07-18
Payer: COMMERCIAL

## 2025-07-18 DIAGNOSIS — F25.0 SCHIZOAFFECTIVE DISORDER, BIPOLAR TYPE: ICD-10-CM

## 2025-07-18 DIAGNOSIS — F43.10 POST-TRAUMATIC STRESS DISORDER, UNSPECIFIED: ICD-10-CM

## 2025-07-18 PROCEDURE — 99214 OFFICE O/P EST MOD 30 MIN: CPT | Mod: 95

## 2025-07-18 PROCEDURE — 98007 SYNCH AUDIO-VIDEO EST HI 40: CPT

## 2025-07-19 DIAGNOSIS — F43.10 POST-TRAUMATIC STRESS DISORDER, UNSPECIFIED: ICD-10-CM

## 2025-07-19 DIAGNOSIS — F25.0 SCHIZOAFFECTIVE DISORDER, BIPOLAR TYPE: ICD-10-CM

## 2025-08-07 ENCOUNTER — OUTPATIENT (OUTPATIENT)
Dept: OUTPATIENT SERVICES | Facility: HOSPITAL | Age: 50
LOS: 1 days | End: 2025-08-07
Payer: COMMERCIAL

## 2025-08-07 ENCOUNTER — APPOINTMENT (OUTPATIENT)
Dept: PSYCHIATRY | Facility: CLINIC | Age: 50
End: 2025-08-07

## 2025-08-07 DIAGNOSIS — F25.0 SCHIZOAFFECTIVE DISORDER, BIPOLAR TYPE: ICD-10-CM

## 2025-08-07 PROCEDURE — 90834 PSYTX W PT 45 MINUTES: CPT

## 2025-08-08 DIAGNOSIS — F25.0 SCHIZOAFFECTIVE DISORDER, BIPOLAR TYPE: ICD-10-CM

## 2025-08-21 ENCOUNTER — OUTPATIENT (OUTPATIENT)
Dept: OUTPATIENT SERVICES | Facility: HOSPITAL | Age: 50
LOS: 1 days | End: 2025-08-21
Payer: COMMERCIAL

## 2025-08-21 ENCOUNTER — APPOINTMENT (OUTPATIENT)
Dept: PSYCHIATRY | Facility: CLINIC | Age: 50
End: 2025-08-21

## 2025-08-21 DIAGNOSIS — F25.0 SCHIZOAFFECTIVE DISORDER, BIPOLAR TYPE: ICD-10-CM

## 2025-08-21 PROCEDURE — 90832 PSYTX W PT 30 MINUTES: CPT

## 2025-08-22 DIAGNOSIS — F25.0 SCHIZOAFFECTIVE DISORDER, BIPOLAR TYPE: ICD-10-CM

## 2025-09-04 ENCOUNTER — OUTPATIENT (OUTPATIENT)
Dept: OUTPATIENT SERVICES | Facility: HOSPITAL | Age: 50
LOS: 1 days | End: 2025-09-04
Payer: COMMERCIAL

## 2025-09-04 ENCOUNTER — APPOINTMENT (OUTPATIENT)
Dept: PSYCHIATRY | Facility: CLINIC | Age: 50
End: 2025-09-04

## 2025-09-04 DIAGNOSIS — F25.0 SCHIZOAFFECTIVE DISORDER, BIPOLAR TYPE: ICD-10-CM

## 2025-09-04 PROCEDURE — 90832 PSYTX W PT 30 MINUTES: CPT

## 2025-09-05 DIAGNOSIS — F25.0 SCHIZOAFFECTIVE DISORDER, BIPOLAR TYPE: ICD-10-CM

## 2025-09-18 ENCOUNTER — APPOINTMENT (OUTPATIENT)
Dept: PSYCHIATRY | Facility: CLINIC | Age: 50
End: 2025-09-18
Payer: COMMERCIAL

## 2025-09-18 VITALS — HEIGHT: 65 IN | BODY MASS INDEX: 48.82 KG/M2 | WEIGHT: 293 LBS

## 2025-09-18 DIAGNOSIS — F25.0 SCHIZOAFFECTIVE DISORDER, BIPOLAR TYPE: ICD-10-CM

## 2025-09-18 DIAGNOSIS — F43.10 POST-TRAUMATIC STRESS DISORDER, UNSPECIFIED: ICD-10-CM

## 2025-09-18 PROCEDURE — 99214 OFFICE O/P EST MOD 30 MIN: CPT
